# Patient Record
Sex: FEMALE | Race: BLACK OR AFRICAN AMERICAN | NOT HISPANIC OR LATINO | Employment: FULL TIME | URBAN - METROPOLITAN AREA
[De-identification: names, ages, dates, MRNs, and addresses within clinical notes are randomized per-mention and may not be internally consistent; named-entity substitution may affect disease eponyms.]

---

## 2017-10-24 ENCOUNTER — OFFICE VISIT (OUTPATIENT)
Dept: FAMILY MEDICINE CLINIC | Facility: CLINIC | Age: 37
End: 2017-10-24

## 2017-10-24 VITALS
SYSTOLIC BLOOD PRESSURE: 128 MMHG | HEART RATE: 90 BPM | BODY MASS INDEX: 30.16 KG/M2 | OXYGEN SATURATION: 98 % | WEIGHT: 199 LBS | TEMPERATURE: 98.7 F | DIASTOLIC BLOOD PRESSURE: 82 MMHG | HEIGHT: 68 IN

## 2017-10-24 DIAGNOSIS — R94.31 ABNORMAL EKG: ICD-10-CM

## 2017-10-24 DIAGNOSIS — E55.9 VITAMIN D DEFICIENCY: ICD-10-CM

## 2017-10-24 DIAGNOSIS — D50.0 IRON DEFICIENCY ANEMIA DUE TO CHRONIC BLOOD LOSS: ICD-10-CM

## 2017-10-24 DIAGNOSIS — R07.9 CHEST PAIN, UNSPECIFIED TYPE: ICD-10-CM

## 2017-10-24 DIAGNOSIS — R60.0 BILATERAL LEG EDEMA: Primary | ICD-10-CM

## 2017-10-24 DIAGNOSIS — M25.50 ARTHRALGIA OF MULTIPLE JOINTS: ICD-10-CM

## 2017-10-24 DIAGNOSIS — G43.719 INTRACTABLE CHRONIC MIGRAINE WITHOUT AURA AND WITHOUT STATUS MIGRAINOSUS: ICD-10-CM

## 2017-10-24 LAB
25(OH)D3 SERPL-MCNC: 27.4 NG/ML (ref 30–100)
ALBUMIN SERPL-MCNC: 4.1 G/DL (ref 3.5–5.2)
ALBUMIN/GLOB SERPL: 1.2 G/DL
ALP SERPL-CCNC: 85 U/L (ref 39–117)
ALT SERPL W P-5'-P-CCNC: 8 U/L (ref 1–33)
ANION GAP SERPL CALCULATED.3IONS-SCNC: 10.4 MMOL/L
AST SERPL-CCNC: 10 U/L (ref 1–32)
BILIRUB SERPL-MCNC: <0.2 MG/DL (ref 0.1–1.2)
BUN BLD-MCNC: 8 MG/DL (ref 6–20)
BUN/CREAT SERPL: 12.7 (ref 7–25)
CALCIUM SPEC-SCNC: 9.7 MG/DL (ref 8.6–10.5)
CHLORIDE SERPL-SCNC: 103 MMOL/L (ref 98–107)
CHROMATIN AB SERPL-ACNC: <10 IU/ML (ref 0–14)
CO2 SERPL-SCNC: 26.6 MMOL/L (ref 22–29)
CREAT BLD-MCNC: 0.63 MG/DL (ref 0.57–1)
CRP SERPL-MCNC: 0.76 MG/DL (ref 0–0.5)
ERYTHROCYTE [DISTWIDTH] IN BLOOD BY AUTOMATED COUNT: 14.3 % (ref 4.5–15)
ERYTHROCYTE [SEDIMENTATION RATE] IN BLOOD: 20 MM/HR (ref 0–20)
GFR SERPL CREATININE-BSD FRML MDRD: 128 ML/MIN/1.73
GLOBULIN UR ELPH-MCNC: 3.4 GM/DL
GLUCOSE BLD-MCNC: 90 MG/DL (ref 65–99)
HCT VFR BLD AUTO: 36.9 % (ref 31–42)
HGB BLD-MCNC: 11.2 G/DL (ref 12–18)
LYMPHOCYTES # BLD AUTO: 2.3 10*3/MM3 (ref 1.2–3.4)
LYMPHOCYTES NFR BLD AUTO: 27.9 % (ref 21–51)
MCH RBC QN AUTO: 26.7 PG (ref 26.1–33.1)
MCHC RBC AUTO-ENTMCNC: 30.3 G/DL (ref 33–37)
MCV RBC AUTO: 88 FL (ref 80–99)
MONOCYTES # BLD AUTO: 0.6 10*3/MM3 (ref 0.1–0.6)
MONOCYTES NFR BLD AUTO: 7.2 % (ref 2–9)
NEUTROPHILS # BLD AUTO: 5.4 10*3/MM3 (ref 1.4–6.5)
NEUTROPHILS NFR BLD AUTO: 64.9 % (ref 42–75)
NT-PROBNP SERPL-MCNC: 14.6 PG/ML (ref 0–450)
PLATELET # BLD AUTO: 370 10*3/MM3 (ref 150–450)
PMV BLD AUTO: 7 FL (ref 7.1–10.5)
POTASSIUM BLD-SCNC: 4.2 MMOL/L (ref 3.5–5.2)
PROT SERPL-MCNC: 7.5 G/DL (ref 6–8.5)
RBC # BLD AUTO: 4.2 10*6/MM3 (ref 4–6)
SODIUM BLD-SCNC: 140 MMOL/L (ref 136–145)
TSH SERPL DL<=0.05 MIU/L-ACNC: 2.13 MIU/ML (ref 0.27–4.2)
URATE SERPL-MCNC: 3.5 MG/DL (ref 2.4–5.7)
WBC NRBC COR # BLD: 8.3 10*3/MM3 (ref 4.5–10)

## 2017-10-24 PROCEDURE — 83880 ASSAY OF NATRIURETIC PEPTIDE: CPT | Performed by: NURSE PRACTITIONER

## 2017-10-24 PROCEDURE — 82306 VITAMIN D 25 HYDROXY: CPT | Performed by: NURSE PRACTITIONER

## 2017-10-24 PROCEDURE — 80053 COMPREHEN METABOLIC PANEL: CPT | Performed by: NURSE PRACTITIONER

## 2017-10-24 PROCEDURE — 85025 COMPLETE CBC W/AUTO DIFF WBC: CPT | Performed by: NURSE PRACTITIONER

## 2017-10-24 PROCEDURE — 93000 ELECTROCARDIOGRAM COMPLETE: CPT | Performed by: NURSE PRACTITIONER

## 2017-10-24 PROCEDURE — 36415 COLL VENOUS BLD VENIPUNCTURE: CPT | Performed by: NURSE PRACTITIONER

## 2017-10-24 PROCEDURE — 84550 ASSAY OF BLOOD/URIC ACID: CPT | Performed by: NURSE PRACTITIONER

## 2017-10-24 PROCEDURE — 86038 ANTINUCLEAR ANTIBODIES: CPT | Performed by: NURSE PRACTITIONER

## 2017-10-24 PROCEDURE — 84443 ASSAY THYROID STIM HORMONE: CPT | Performed by: NURSE PRACTITIONER

## 2017-10-24 PROCEDURE — 86431 RHEUMATOID FACTOR QUANT: CPT | Performed by: NURSE PRACTITIONER

## 2017-10-24 PROCEDURE — 99214 OFFICE O/P EST MOD 30 MIN: CPT | Performed by: NURSE PRACTITIONER

## 2017-10-24 PROCEDURE — 85652 RBC SED RATE AUTOMATED: CPT | Performed by: NURSE PRACTITIONER

## 2017-10-24 PROCEDURE — 86140 C-REACTIVE PROTEIN: CPT | Performed by: NURSE PRACTITIONER

## 2017-10-24 RX ORDER — NORETHINDRONE ACETATE AND ETHINYL ESTRADIOL, AND FERROUS FUMARATE 1MG-20(24)
KIT ORAL
Refills: 3 | COMMUNITY
Start: 2017-07-19 | End: 2020-04-22

## 2017-10-24 RX ORDER — TRIAMTERENE AND HYDROCHLOROTHIAZIDE 37.5; 25 MG/1; MG/1
CAPSULE ORAL
Qty: 30 CAPSULE | Refills: 11 | Status: SHIPPED | OUTPATIENT
Start: 2017-10-24 | End: 2018-09-21 | Stop reason: SDUPTHER

## 2017-10-24 RX ORDER — DICLOFENAC 35 MG/1
CAPSULE ORAL
Qty: 90 CAPSULE | Refills: 2 | Status: SHIPPED | OUTPATIENT
Start: 2017-10-24 | End: 2018-01-16 | Stop reason: SDUPTHER

## 2017-10-24 NOTE — PATIENT INSTRUCTIONS
restart dyazide 37.5/25 mg daily and monitor BP and swelling, check labs and call with results, abnl EKG refer cardiology consult, consider anxiety or gerd, trial zorvolex 35 mg BID-TID prn

## 2017-10-24 NOTE — PROGRESS NOTES
Subjective   Sonja Sierra is a 37 y.o. female.     History of Present Illness   C/o intermittent chest pain worse with drinking liquids with increasing freq with chest tightness last week, had episode last week in child support thinks r/t stress with elevated HR according to apple watch, With B LE edema on dyazide 37.5/25 mg daily 06/16, then was erx lasix 40 mg 10/16 but had muscle cramps, no prev dx HTN but fam hx of HTN mom, no dizziness, no prev EKG in chart, no prev cardiac consult, some intermittent heartburn, no abd pain   C/o arthritis pain knee and ankle pain request refill arthritis medication, fam hx mom gout  Sees [facility] neuro last saw 10/16 with chronic HA was last tx diclofenac 50 mg but stopped when started other arthritis medication unsure of name, no NV  Sees GYN [address] UTD pap 03/17 with hx of vit d def finished all supplement and with heavy menses and hx of anemia, last hgb 10 07/16    The following portions of the patient's history were reviewed and updated as appropriate: allergies, current medications, past family history, past medical history, past social history, past surgical history and problem list.    Review of Systems   Constitutional: Negative for fever.   HENT: Negative for trouble swallowing and voice change.    Respiratory: Negative for cough, shortness of breath and wheezing.    Cardiovascular: Positive for chest pain and leg swelling. Negative for palpitations.   Gastrointestinal: Negative for abdominal distention, abdominal pain, anal bleeding, blood in stool, constipation, diarrhea, nausea, rectal pain and vomiting.        Heartburn   Endocrine: Negative for cold intolerance, heat intolerance, polydipsia, polyphagia and polyuria.   Musculoskeletal: Positive for arthralgias and joint swelling. Negative for back pain, gait problem, myalgias, neck pain and neck stiffness.   Neurological: Negative for dizziness and headaches.   Psychiatric/Behavioral: Negative for  dysphoric mood, sleep disturbance and suicidal ideas. The patient is nervous/anxious.    All other systems reviewed and are negative.      Objective   Physical Exam   Constitutional: She is oriented to person, place, and time. She appears well-developed and well-nourished.   HENT:   Head: Normocephalic and atraumatic.   Eyes: Conjunctivae and EOM are normal. Pupils are equal, round, and reactive to light.   Neck: Normal range of motion. Neck supple. No thyromegaly present.   Cardiovascular: Normal rate, regular rhythm and normal heart sounds.    Pulmonary/Chest: Effort normal and breath sounds normal.   Musculoskeletal: Normal range of motion. She exhibits edema (B LE pitting).   Lymphadenopathy:     She has no cervical adenopathy.   Neurological: She is alert and oriented to person, place, and time.   Skin: Skin is warm and dry.   Psychiatric: She has a normal mood and affect. Her behavior is normal. Judgment and thought content normal.   Vitals reviewed.    ECG 12 Lead  Date/Time: 10/24/2017 5:51 PM  Performed by: MLAICK NORRIS  Authorized by: MALICK NORRIS   Comparison: not compared with previous ECG   Rhythm: sinus rhythm and A-V block  Rate: normal  Conduction: 1st degree  ST Segments: ST segments normal  T Waves: T waves normal  QRS axis: normal  Other: no other findings  Clinical impression: abnormal ECG          Assessment/Plan   Sonja was seen today for edema, chest pain and med refill.    Diagnoses and all orders for this visit:    Bilateral leg edema  -     TSH  -     Ambulatory Referral to Cardiology  -     proBNP    Intractable chronic migraine without aura and without status migrainosus  -     TSH    Arthralgia of multiple joints  -     Comprehensive Metabolic Panel  -     CRYSTAL  -     C-reactive Protein  -     Rheumatoid Factor, Quant  -     Sedimentation Rate  -     Uric Acid    Iron deficiency anemia due to chronic blood loss  -     CBC & Differential  -     CBC Auto  Differential    Vitamin D deficiency  -     Vitamin D 25 Hydroxy    Chest pain, unspecified type  -     Ambulatory Referral to Cardiology    Abnormal EKG  -     Ambulatory Referral to Cardiology    Other orders  -     triamterene-hydrochlorothiazide (DYAZIDE) 37.5-25 MG per capsule; 1 po qday prn edema  -     Diclofenac (ZORVOLEX) 35 MG capsule; 1 PO TID prn pain  -     ECG 12 Lead    restart dyazide 37.5/25 mg daily and monitor BP and swelling, check labs and call with results, abnl EKG refer cardiology consult, consider anxiety or gerd, trial zorvolex 35 mg BID-TID prn

## 2017-10-25 ENCOUNTER — TELEPHONE (OUTPATIENT)
Dept: FAMILY MEDICINE CLINIC | Facility: CLINIC | Age: 37
End: 2017-10-25

## 2017-10-25 LAB — ANA SER QL: NEGATIVE

## 2017-10-25 RX ORDER — ERGOCALCIFEROL 1.25 MG/1
50000 CAPSULE ORAL
Qty: 12 CAPSULE | Refills: 0 | Status: SHIPPED | OUTPATIENT
Start: 2017-10-25 | End: 2020-04-22

## 2017-10-25 NOTE — TELEPHONE ENCOUNTER
Thyroid, BS, kidney, liver normal. Vit D is low erx vit D 50,000 u once weekly x 12 weeks. Autoimmune panel is negative but shows swelling, think r/t arthritis, please monitor pain on new medication zorvolex 35 mg. Your fluid volume is minimal so not swelling much. Please monitor restarting dyazide daily. Your hemoglobin is sl low, recommend MVI to supplement heavy menses

## 2017-10-26 ENCOUNTER — TELEPHONE (OUTPATIENT)
Dept: FAMILY MEDICINE CLINIC | Facility: CLINIC | Age: 37
End: 2017-10-26

## 2017-11-22 ENCOUNTER — OFFICE VISIT (OUTPATIENT)
Dept: CARDIOLOGY | Facility: CLINIC | Age: 37
End: 2017-11-22

## 2017-11-22 VITALS
WEIGHT: 202 LBS | DIASTOLIC BLOOD PRESSURE: 82 MMHG | SYSTOLIC BLOOD PRESSURE: 138 MMHG | HEIGHT: 68 IN | HEART RATE: 77 BPM | BODY MASS INDEX: 30.62 KG/M2

## 2017-11-22 DIAGNOSIS — R60.0 LOWER EXTREMITY EDEMA: ICD-10-CM

## 2017-11-22 DIAGNOSIS — R06.09 DYSPNEA ON EXERTION: ICD-10-CM

## 2017-11-22 DIAGNOSIS — R07.9 CHEST PAIN, UNSPECIFIED TYPE: Primary | ICD-10-CM

## 2017-11-22 PROCEDURE — 93000 ELECTROCARDIOGRAM COMPLETE: CPT | Performed by: INTERNAL MEDICINE

## 2017-11-22 PROCEDURE — 99204 OFFICE O/P NEW MOD 45 MIN: CPT | Performed by: INTERNAL MEDICINE

## 2017-11-22 RX ORDER — LEVONORGESTREL AND ETHINYL ESTRADIOL 0.1-0.02MG
1 KIT ORAL DAILY
Qty: 28 TABLET
Start: 2017-11-22 | End: 2018-03-07

## 2017-11-22 NOTE — PROGRESS NOTES
Subjective:     Encounter Date:11/22/2017      Patient ID: Sonja Sierra is a 37 y.o. female.    Chief Complaint:  History of Present Illness    This is a 37-year-old female with no significant past medical history who presents for evaluation of lower extremity edema, dyspnea on exertion, and chest pain.    The patient reports that she's been having episodes of chest discomfort for the last few months.  It primarily occurs when she takes a sip of water, swallows pills, or sometimes when she eats solid food.  Symptoms do not occur every time she eats or swallows.  It occurs at least a few times a week however.  She feels like it's increasing in frequency lately.  The discomfort is quite painful and she describes it as a tightness in her chest to the point where she has to clutch her chest.  She is only had one episode of this not related to swallowing it occurred when she was in court for child custody hearing and was feeling stressed and anxious when she developed similar symptoms.  He denies any chest pain with exertion.  She does like she is more short of breath than normal with activity such as climbing stairs.  She has also been having more issues with lower extremity edema primarily in her ankles and feet.  She notices this at the end of the day and it resolves by the following morning.  She was recently started on triamterene/hydrochlorothiazide with some improvement in her lower extremity symptoms.  He denies any family history of heart disease.      Review of Systems   Constitution: Negative for weakness and malaise/fatigue.   HENT: Negative for hearing loss, hoarse voice, nosebleeds and sore throat.    Eyes: Negative for pain.   Cardiovascular: Positive for chest pain, dyspnea on exertion and leg swelling. Negative for claudication, cyanosis, irregular heartbeat, near-syncope, orthopnea, palpitations, paroxysmal nocturnal dyspnea and syncope.   Respiratory: Negative for shortness of breath and snoring.     Endocrine: Negative for cold intolerance, heat intolerance, polydipsia, polyphagia and polyuria.   Skin: Negative for itching and rash.   Musculoskeletal: Negative for arthritis, falls, joint pain, joint swelling, muscle cramps, muscle weakness and myalgias.   Gastrointestinal: Negative for constipation, diarrhea, dysphagia, heartburn, hematemesis, hematochezia, melena, nausea and vomiting.   Genitourinary: Negative for frequency, hematuria and hesitancy.   Neurological: Negative for excessive daytime sleepiness, dizziness, headaches, light-headedness and numbness.   Psychiatric/Behavioral: Negative for depression. The patient is not nervous/anxious.           Current Outpatient Prescriptions:   •  Diclofenac (ZORVOLEX) 35 MG capsule, 1 PO TID prn pain, Disp: 90 capsule, Rfl: 2  •  ORSYTHIA 0.1-20 MG-MCG per tablet, Take 1 tablet by mouth Daily., Disp: 28 tablet, Rfl:   •  TAYTULLA 1-20 MG-MCG(24) capsule, TAKE ONE CAPSULE BY MOUTH EVERY DAY, Disp: , Rfl: 3  •  triamterene-hydrochlorothiazide (DYAZIDE) 37.5-25 MG per capsule, 1 po qday prn edema, Disp: 30 capsule, Rfl: 11  •  vitamin D (ERGOCALCIFEROL) 59660 units capsule capsule, Take 1 capsule by mouth Every 7 (Seven) Days., Disp: 12 capsule, Rfl: 0  •  loratadine (CLARITIN) 10 MG tablet, Take 1 tablet by mouth daily., Disp: 30 tablet, Rfl: 5    Past Medical History:   Diagnosis Date   • Arthralgia of multiple joints    • Bilateral leg edema    • Chest pain    • Chest pain    • Iron deficiency anemia due to chronic blood loss    • Migraine    • Vitamin D deficiency      Past Surgical History:   Procedure Laterality Date   • UTERINE FIBROID SURGERY       Family History   Problem Relation Age of Onset   • Hypertension Mother    • Gout Mother    • Arthritis Mother    • Hypertension Father      Social History   Substance Use Topics   • Smoking status: Never Smoker   • Smokeless tobacco: Never Used   • Alcohol use Yes      Comment: occasionally           ECG 12  "Lead  Date/Time: 11/22/2017 3:20 PM  Performed by: WENDIE EM  Authorized by: WENDIE EM   Comparison: compared with previous ECG   Similar to previous ECG  Rhythm: sinus rhythm  Conduction: 1st degree               Objective:         Visit Vitals   • /82 (BP Location: Right arm, Patient Position: Sitting)  Comment: lft 132 80   • Pulse 77   • Ht 68\" (172.7 cm)   • Wt 202 lb (91.6 kg)   • BMI 30.71 kg/m2          Physical Exam   Constitutional: She is oriented to person, place, and time. She appears well-developed and well-nourished.   HENT:   Head: Normocephalic and atraumatic.   Eyes: Conjunctivae, EOM and lids are normal. Pupils are equal, round, and reactive to light.   Neck: Normal range of motion and full passive range of motion without pain. Neck supple. No JVD present. Carotid bruit is not present.   Cardiovascular: Normal rate, regular rhythm, S1 normal and S2 normal.  Exam reveals no gallop.    No murmur heard.  Pulses:       Radial pulses are 2+ on the right side, and 2+ on the left side.   No bilateral lower extremity edema   Pulmonary/Chest: Effort normal and breath sounds normal.   Abdominal: Soft. Normal appearance.   Lymphadenopathy:     She has no cervical adenopathy.   Neurological: She is alert and oriented to person, place, and time.   Skin: Skin is warm, dry and intact.   Psychiatric: She has a normal mood and affect.       Lab Review:       Assessment:          Diagnosis Plan   1. Chest pain, unspecified type  Treadmill Stress Test    Adult Transthoracic Echo Complete W/ Cont if Necessary Per Protocol   2. Dyspnea on exertion  Treadmill Stress Test    Adult Transthoracic Echo Complete W/ Cont if Necessary Per Protocol   3. Lower extremity edema  Adult Transthoracic Echo Complete W/ Cont if Necessary Per Protocol          Plan:       1.  Chest discomfort.  Based on her history I suspect her pain is more likely due to an esophageal issue possibly esophageal spasm.  I explained " this at length with the patient.  She is still quite concerned about possible cardiac issue so in order to rule this out and get her set up for a plain treadmill stress test.  In the meanwhile I suggested she try taking over-the-counter omeprazole or ranitidine to see if this will help with symptoms.  2.  Dyspnea on exertion.  In addition to the stress test will also get her set up for an echocardiogram.  3.  Lower extremity edema.  As per #2.    Will call and discussed results of the stress test and the echocardiogram.  If her cardiac workup does end up being normal would recommend a GI evaluation.

## 2017-12-12 ENCOUNTER — HOSPITAL ENCOUNTER (OUTPATIENT)
Dept: CARDIOLOGY | Facility: HOSPITAL | Age: 37
Discharge: HOME | End: 2017-12-12
Attending: INTERNAL MEDICINE

## 2017-12-12 ENCOUNTER — HOSPITAL ENCOUNTER (OUTPATIENT)
Dept: CARDIOLOGY | Facility: HOSPITAL | Age: 37
Discharge: HOME | End: 2017-12-12
Attending: INTERNAL MEDICINE | Admitting: INTERNAL MEDICINE

## 2017-12-12 VITALS
SYSTOLIC BLOOD PRESSURE: 140 MMHG | WEIGHT: 202 LBS | DIASTOLIC BLOOD PRESSURE: 80 MMHG | BODY MASS INDEX: 30.62 KG/M2 | HEIGHT: 68 IN | HEART RATE: 55 BPM

## 2017-12-12 LAB
BH CV ECHO MEAS - ACS: 2.1 CM
BH CV ECHO MEAS - AO MAX PG (FULL): 3.7 MMHG
BH CV ECHO MEAS - AO MAX PG: 6.8 MMHG
BH CV ECHO MEAS - AO MEAN PG (FULL): 1.9 MMHG
BH CV ECHO MEAS - AO MEAN PG: 3.8 MMHG
BH CV ECHO MEAS - AO ROOT AREA (BSA CORRECTED): 1.4
BH CV ECHO MEAS - AO ROOT AREA: 6.5 CM^2
BH CV ECHO MEAS - AO ROOT DIAM: 2.9 CM
BH CV ECHO MEAS - AO V2 MAX: 130.3 CM/SEC
BH CV ECHO MEAS - AO V2 MEAN: 90.1 CM/SEC
BH CV ECHO MEAS - AO V2 VTI: 31.7 CM
BH CV ECHO MEAS - AVA(I,A): 1.9 CM^2
BH CV ECHO MEAS - AVA(I,D): 1.9 CM^2
BH CV ECHO MEAS - AVA(V,A): 1.9 CM^2
BH CV ECHO MEAS - AVA(V,D): 1.9 CM^2
BH CV ECHO MEAS - BSA(HAYCOCK): 2.1 M^2
BH CV ECHO MEAS - BSA: 2.1 M^2
BH CV ECHO MEAS - BZI_BMI: 30.7 KILOGRAMS/M^2
BH CV ECHO MEAS - BZI_METRIC_HEIGHT: 172.7 CM
BH CV ECHO MEAS - BZI_METRIC_WEIGHT: 91.6 KG
BH CV ECHO MEAS - CONTRAST EF (2CH): 60.2 ML/M^2
BH CV ECHO MEAS - CONTRAST EF 4CH: 55.2 ML/M^2
BH CV ECHO MEAS - EDV(MOD-SP2): 98 ML
BH CV ECHO MEAS - EDV(MOD-SP4): 87 ML
BH CV ECHO MEAS - EDV(TEICH): 116.9 ML
BH CV ECHO MEAS - EF(CUBED): 73.8 %
BH CV ECHO MEAS - EF(MOD-SP2): 60.2 %
BH CV ECHO MEAS - EF(MOD-SP4): 55.2 %
BH CV ECHO MEAS - EF(TEICH): 65.3 %
BH CV ECHO MEAS - ESV(MOD-SP2): 39 ML
BH CV ECHO MEAS - ESV(MOD-SP4): 39 ML
BH CV ECHO MEAS - ESV(TEICH): 40.5 ML
BH CV ECHO MEAS - FS: 36 %
BH CV ECHO MEAS - IVS/LVPW: 1.2
BH CV ECHO MEAS - IVSD: 0.93 CM
BH CV ECHO MEAS - LAT PEAK E' VEL: 16 CM/SEC
BH CV ECHO MEAS - LV DIASTOLIC VOL/BSA (35-75): 42.4 ML/M^2
BH CV ECHO MEAS - LV MASS(C)D: 143.7 GRAMS
BH CV ECHO MEAS - LV MASS(C)DI: 70 GRAMS/M^2
BH CV ECHO MEAS - LV MAX PG: 3 MMHG
BH CV ECHO MEAS - LV MEAN PG: 1.9 MMHG
BH CV ECHO MEAS - LV SYSTOLIC VOL/BSA (12-30): 19 ML/M^2
BH CV ECHO MEAS - LV V1 MAX: 87.3 CM/SEC
BH CV ECHO MEAS - LV V1 MEAN: 66.2 CM/SEC
BH CV ECHO MEAS - LV V1 VTI: 20.7 CM
BH CV ECHO MEAS - LVIDD: 5 CM
BH CV ECHO MEAS - LVIDS: 3.2 CM
BH CV ECHO MEAS - LVLD AP2: 8.1 CM
BH CV ECHO MEAS - LVLD AP4: 7.9 CM
BH CV ECHO MEAS - LVLS AP2: 7.1 CM
BH CV ECHO MEAS - LVLS AP4: 7 CM
BH CV ECHO MEAS - LVOT AREA (M): 2.8 CM^2
BH CV ECHO MEAS - LVOT AREA: 2.8 CM^2
BH CV ECHO MEAS - LVOT DIAM: 1.9 CM
BH CV ECHO MEAS - LVPWD: 0.75 CM
BH CV ECHO MEAS - MED PEAK E' VEL: 9 CM/SEC
BH CV ECHO MEAS - MV A DUR: 0.1 SEC
BH CV ECHO MEAS - MV A MAX VEL: 73.7 CM/SEC
BH CV ECHO MEAS - MV DEC SLOPE: 624.5 CM/SEC^2
BH CV ECHO MEAS - MV DEC TIME: 0.17 SEC
BH CV ECHO MEAS - MV E MAX VEL: 101.4 CM/SEC
BH CV ECHO MEAS - MV E/A: 1.4
BH CV ECHO MEAS - MV MAX PG: 4.7 MMHG
BH CV ECHO MEAS - MV MEAN PG: 1.6 MMHG
BH CV ECHO MEAS - MV P1/2T MAX VEL: 100.9 CM/SEC
BH CV ECHO MEAS - MV P1/2T: 47.3 MSEC
BH CV ECHO MEAS - MV V2 MAX: 108.6 CM/SEC
BH CV ECHO MEAS - MV V2 MEAN: 56.8 CM/SEC
BH CV ECHO MEAS - MV V2 VTI: 31.4 CM
BH CV ECHO MEAS - MVA P1/2T LCG: 2.2 CM^2
BH CV ECHO MEAS - MVA(P1/2T): 4.6 CM^2
BH CV ECHO MEAS - MVA(VTI): 1.9 CM^2
BH CV ECHO MEAS - PA MAX PG (FULL): 4.5 MMHG
BH CV ECHO MEAS - PA MAX PG: 6.5 MMHG
BH CV ECHO MEAS - PA V2 MAX: 127.6 CM/SEC
BH CV ECHO MEAS - PI END-D VEL: 101.8 CM/SEC
BH CV ECHO MEAS - PULM A REVS DUR: 0.12 SEC
BH CV ECHO MEAS - PULM A REVS VEL: 28.1 CM/SEC
BH CV ECHO MEAS - PULM DIAS VEL: 36.9 CM/SEC
BH CV ECHO MEAS - PULM S/D: 1.9
BH CV ECHO MEAS - PULM SYS VEL: 71.3 CM/SEC
BH CV ECHO MEAS - PVA(V,A): 1.7 CM^2
BH CV ECHO MEAS - PVA(V,D): 1.7 CM^2
BH CV ECHO MEAS - QP/QS: 0.85
BH CV ECHO MEAS - RV MAX PG: 2 MMHG
BH CV ECHO MEAS - RV MEAN PG: 1.2 MMHG
BH CV ECHO MEAS - RV V1 MAX: 70.8 CM/SEC
BH CV ECHO MEAS - RV V1 MEAN: 50.6 CM/SEC
BH CV ECHO MEAS - RV V1 VTI: 16.6 CM
BH CV ECHO MEAS - RVOT AREA: 3 CM^2
BH CV ECHO MEAS - RVOT DIAM: 2 CM
BH CV ECHO MEAS - SI(AO): 100.3 ML/M^2
BH CV ECHO MEAS - SI(CUBED): 44.3 ML/M^2
BH CV ECHO MEAS - SI(LVOT): 28.6 ML/M^2
BH CV ECHO MEAS - SI(MOD-SP2): 28.7 ML/M^2
BH CV ECHO MEAS - SI(MOD-SP4): 23.4 ML/M^2
BH CV ECHO MEAS - SI(TEICH): 37.2 ML/M^2
BH CV ECHO MEAS - SUP REN AO DIAM: 1.2 CM
BH CV ECHO MEAS - SV(AO): 205.8 ML
BH CV ECHO MEAS - SV(CUBED): 90.8 ML
BH CV ECHO MEAS - SV(LVOT): 58.7 ML
BH CV ECHO MEAS - SV(MOD-SP2): 59 ML
BH CV ECHO MEAS - SV(MOD-SP4): 48 ML
BH CV ECHO MEAS - SV(RVOT): 49.9 ML
BH CV ECHO MEAS - SV(TEICH): 76.4 ML
BH CV ECHO MEAS - TAPSE (>1.6): 2.1 CM2
BH CV STRESS BP STAGE 1: NORMAL
BH CV STRESS BP STAGE 2: NORMAL
BH CV STRESS BP STAGE 3: NORMAL
BH CV STRESS DURATION MIN STAGE 1: 3
BH CV STRESS DURATION MIN STAGE 2: 3
BH CV STRESS DURATION MIN STAGE 3: 3
BH CV STRESS DURATION SEC STAGE 1: 0
BH CV STRESS DURATION SEC STAGE 2: 0
BH CV STRESS DURATION SEC STAGE 3: 0
BH CV STRESS GRADE STAGE 1: 10
BH CV STRESS GRADE STAGE 2: 12
BH CV STRESS GRADE STAGE 3: 14
BH CV STRESS HR STAGE 1: 130
BH CV STRESS HR STAGE 2: 153
BH CV STRESS HR STAGE 3: 176
BH CV STRESS METS STAGE 1: 5
BH CV STRESS METS STAGE 2: 7.5
BH CV STRESS METS STAGE 3: 10
BH CV STRESS PROTOCOL 1: NORMAL
BH CV STRESS RECOVERY BP: NORMAL MMHG
BH CV STRESS RECOVERY HR: 98 BPM
BH CV STRESS SPEED STAGE 1: 1.7
BH CV STRESS SPEED STAGE 2: 2.5
BH CV STRESS SPEED STAGE 3: 3.4
BH CV STRESS STAGE 1: 1
BH CV STRESS STAGE 2: 2
BH CV STRESS STAGE 3: 3
BH CV XLRA - RV BASE: 3.3 CM
BH CV XLRA - TDI S': 15 CM/SEC
E/E' RATIO: 8
LEFT ATRIUM VOLUME INDEX: 33 ML/M2
LEFT ATRIUM VOLUME: 69 CM3
MAXIMAL PREDICTED HEART RATE: 181 BPM
MAXIMAL PREDICTED HEART RATE: 181 BPM
PERCENT MAX PREDICTED HR: 97.24 %
STRESS BASELINE BP: NORMAL MMHG
STRESS BASELINE HR: 73 BPM
STRESS PERCENT HR: 114 %
STRESS POST ESTIMATED WORKLOAD: 10 METS
STRESS POST EXERCISE DUR MIN: 9 MIN
STRESS POST EXERCISE DUR SEC: 0 SEC
STRESS POST PEAK BP: NORMAL MMHG
STRESS POST PEAK HR: 176 BPM
STRESS TARGET HR: 154 BPM
STRESS TARGET HR: 154 BPM

## 2017-12-12 PROCEDURE — 93306 TTE W/DOPPLER COMPLETE: CPT | Performed by: INTERNAL MEDICINE

## 2017-12-12 PROCEDURE — 93018 CV STRESS TEST I&R ONLY: CPT | Performed by: INTERNAL MEDICINE

## 2017-12-12 PROCEDURE — 93017 CV STRESS TEST TRACING ONLY: CPT

## 2017-12-12 PROCEDURE — 93306 TTE W/DOPPLER COMPLETE: CPT

## 2017-12-12 PROCEDURE — 93016 CV STRESS TEST SUPVJ ONLY: CPT | Performed by: INTERNAL MEDICINE

## 2017-12-13 ENCOUNTER — TELEPHONE (OUTPATIENT)
Dept: CARDIOLOGY | Facility: CLINIC | Age: 37
End: 2017-12-13

## 2017-12-13 NOTE — TELEPHONE ENCOUNTER
Called pt, no answer, left message with normal results. Informed pt if chest pain persist to call PCP to look into GI cause. Thanks, [place]

## 2018-01-17 RX ORDER — DICLOFENAC 35 MG/1
CAPSULE ORAL
Qty: 90 CAPSULE | Refills: 1 | Status: SHIPPED | OUTPATIENT
Start: 2018-01-17 | End: 2018-07-07 | Stop reason: SDUPTHER

## 2018-02-06 RX ORDER — ERGOCALCIFEROL 1.25 MG/1
CAPSULE ORAL
Qty: 12 CAPSULE | Refills: 0 | OUTPATIENT
Start: 2018-02-06

## 2018-03-07 ENCOUNTER — OFFICE VISIT (OUTPATIENT)
Dept: FAMILY MEDICINE CLINIC | Facility: CLINIC | Age: 38
End: 2018-03-07

## 2018-03-07 VITALS
BODY MASS INDEX: 30.01 KG/M2 | DIASTOLIC BLOOD PRESSURE: 70 MMHG | OXYGEN SATURATION: 98 % | HEART RATE: 81 BPM | TEMPERATURE: 98.5 F | SYSTOLIC BLOOD PRESSURE: 120 MMHG | RESPIRATION RATE: 16 BRPM | HEIGHT: 68 IN | WEIGHT: 198 LBS

## 2018-03-07 DIAGNOSIS — J01.10 ACUTE FRONTAL SINUSITIS, RECURRENCE NOT SPECIFIED: Primary | ICD-10-CM

## 2018-03-07 PROCEDURE — 99213 OFFICE O/P EST LOW 20 MIN: CPT | Performed by: NURSE PRACTITIONER

## 2018-03-07 RX ORDER — IRON PS COMPLEX/B12/FOLIC ACID 150-25-1
CAPSULE ORAL
Refills: 6 | COMMUNITY
Start: 2018-02-27 | End: 2020-04-22

## 2018-03-07 RX ORDER — AMOXICILLIN 875 MG/1
875 TABLET, COATED ORAL 2 TIMES DAILY
Qty: 14 TABLET | Refills: 0 | Status: SHIPPED | OUTPATIENT
Start: 2018-03-07 | End: 2018-03-14

## 2018-03-07 RX ORDER — OSELTAMIVIR PHOSPHATE 75 MG/1
75 CAPSULE ORAL
COMMUNITY
Start: 2018-03-05 | End: 2018-03-07

## 2018-03-07 RX ORDER — FLUTICASONE PROPIONATE 50 MCG
2 SPRAY, SUSPENSION (ML) NASAL
COMMUNITY
Start: 2018-03-05 | End: 2020-11-19

## 2018-03-07 NOTE — PATIENT INSTRUCTIONS
Start amoxicillin 875mg 2x day for 7 days.   Cont claritin and flonase.   May cont tamiflu.  If symptoms persist 5-7 days call office.   Increase fluid intake, get plenty of rest.   Patient agrees with plan of care and understands instructions. Call if worsening symptoms or any problems or concerns.

## 2018-03-07 NOTE — PROGRESS NOTES
Subjective   Sonja Sierra is a 37 y.o. female.     History of Present Illness   C/o sinus pressure, started about 5 days ago, she went to [facility] Friday, she states she had aches, sore throat, HA at that time, she was given cough medicine, she states she did not have flu test. She states she has congestion, sinus pressure, she was given tamiflu and nasal spray on Monday, she is currently taking this. She has hoarseness. She denies fever. She does have sore throat, she tried cough drops,she tried OTC allergy medication. She denies wheezing, she does have a productive cough, green in color.     The following portions of the patient's history were reviewed and updated as appropriate: allergies, current medications, past family history, past medical history, past social history, past surgical history and problem list.    Review of Systems   Constitutional: Negative for chills, diaphoresis and fever.   HENT: Positive for congestion, rhinorrhea and sinus pressure. Negative for sore throat.    Eyes: Negative for pain.   Respiratory: Positive for cough. Negative for shortness of breath and wheezing.    Cardiovascular: Negative for chest pain.   Musculoskeletal: Negative for arthralgias and myalgias.   Neurological: Positive for headaches. Negative for dizziness and light-headedness.   All other systems reviewed and are negative.      Objective   Physical Exam   Constitutional: She is oriented to person, place, and time. She appears well-developed and well-nourished.   HENT:   Head: Normocephalic.   Right Ear: Tympanic membrane and ear canal normal.   Left Ear: Tympanic membrane and ear canal normal.   Nose: Mucosal edema present. Right sinus exhibits frontal sinus tenderness. Left sinus exhibits frontal sinus tenderness.   Mouth/Throat: Uvula is midline and mucous membranes are normal. Posterior oropharyngeal erythema present.   Eyes: Pupils are equal, round, and reactive to light.   Neck: Neck supple.   Cardiovascular:  Normal rate, regular rhythm and normal heart sounds.    Pulmonary/Chest: Effort normal and breath sounds normal. No respiratory distress. She has no decreased breath sounds. She has no wheezes. She has no rhonchi.   Musculoskeletal: Normal range of motion.   Neurological: She is alert and oriented to person, place, and time.   Skin: Skin is warm and dry.   Psychiatric: She has a normal mood and affect. Her behavior is normal. Judgment and thought content normal.   Nursing note and vitals reviewed.      Assessment/Plan   Sonja was seen today for sinusitis.    Diagnoses and all orders for this visit:    Acute frontal sinusitis, recurrence not specified    Other orders  -     amoxicillin (AMOXIL) 875 MG tablet; Take 1 tablet by mouth 2 (Two) Times a Day for 7 days.    Start amoxicillin 875mg 2x day for 7 days.   Cont claritin and flonase.   May cont tamiflu.  If symptoms persist 5-7 days call office.   Increase fluid intake, get plenty of rest.   Patient agrees with plan of care and understands instructions. Call if worsening symptoms or any problems or concerns.

## 2018-03-12 ENCOUNTER — TELEPHONE (OUTPATIENT)
Dept: FAMILY MEDICINE CLINIC | Facility: CLINIC | Age: 38
End: 2018-03-12

## 2018-03-12 RX ORDER — FLUCONAZOLE 150 MG/1
150 TABLET ORAL ONCE
Qty: 1 TABLET | Refills: 0 | Status: SHIPPED | OUTPATIENT
Start: 2018-03-12 | End: 2018-03-12

## 2018-03-12 NOTE — TELEPHONE ENCOUNTER
WAS PRESCRIBED AN ANTIBIOTIC AND NOW NEEDS DIFLUCAN CALLED IN.  PLEASE USE [facility] ON [address].

## 2018-07-09 RX ORDER — DICLOFENAC 35 MG/1
CAPSULE ORAL
Qty: 90 CAPSULE | Refills: 2 | Status: SHIPPED | OUTPATIENT
Start: 2018-07-09 | End: 2018-09-29 | Stop reason: SDUPTHER

## 2018-09-18 ENCOUNTER — APPOINTMENT (OUTPATIENT)
Dept: WOMENS IMAGING | Facility: HOSPITAL | Age: 38
End: 2018-09-18

## 2018-09-18 PROCEDURE — 77063 BREAST TOMOSYNTHESIS BI: CPT | Performed by: RADIOLOGY

## 2018-09-18 PROCEDURE — 77067 SCR MAMMO BI INCL CAD: CPT | Performed by: RADIOLOGY

## 2018-09-21 ENCOUNTER — OFFICE VISIT (OUTPATIENT)
Dept: FAMILY MEDICINE CLINIC | Facility: CLINIC | Age: 38
End: 2018-09-21

## 2018-09-21 VITALS
SYSTOLIC BLOOD PRESSURE: 112 MMHG | HEIGHT: 68 IN | RESPIRATION RATE: 16 BRPM | OXYGEN SATURATION: 100 % | HEART RATE: 87 BPM | DIASTOLIC BLOOD PRESSURE: 82 MMHG | WEIGHT: 199.6 LBS | TEMPERATURE: 97.9 F | BODY MASS INDEX: 30.25 KG/M2

## 2018-09-21 DIAGNOSIS — M25.562 PAIN IN BOTH KNEES, UNSPECIFIED CHRONICITY: Primary | ICD-10-CM

## 2018-09-21 DIAGNOSIS — D50.9 IRON DEFICIENCY ANEMIA, UNSPECIFIED IRON DEFICIENCY ANEMIA TYPE: ICD-10-CM

## 2018-09-21 DIAGNOSIS — R60.0 LOWER EXTREMITY EDEMA: ICD-10-CM

## 2018-09-21 DIAGNOSIS — J02.9 SORE THROAT: ICD-10-CM

## 2018-09-21 DIAGNOSIS — T78.40XD ALLERGIC REACTION, SUBSEQUENT ENCOUNTER: ICD-10-CM

## 2018-09-21 DIAGNOSIS — M25.561 PAIN IN BOTH KNEES, UNSPECIFIED CHRONICITY: Primary | ICD-10-CM

## 2018-09-21 LAB
ALBUMIN SERPL-MCNC: 4.4 G/DL (ref 3.5–5.2)
ALBUMIN/GLOB SERPL: 1.3 G/DL
ALP SERPL-CCNC: 97 U/L (ref 39–117)
ALT SERPL W P-5'-P-CCNC: 14 U/L (ref 1–33)
ANION GAP SERPL CALCULATED.3IONS-SCNC: 10.1 MMOL/L
AST SERPL-CCNC: 13 U/L (ref 1–32)
BILIRUB SERPL-MCNC: <0.2 MG/DL (ref 0.1–1.2)
BUN BLD-MCNC: 13 MG/DL (ref 6–20)
BUN/CREAT SERPL: 17.6 (ref 7–25)
CALCIUM SPEC-SCNC: 9.2 MG/DL (ref 8.6–10.5)
CHLORIDE SERPL-SCNC: 103 MMOL/L (ref 98–107)
CHROMATIN AB SERPL-ACNC: <10 IU/ML (ref 0–14)
CO2 SERPL-SCNC: 27.9 MMOL/L (ref 22–29)
CREAT BLD-MCNC: 0.74 MG/DL (ref 0.57–1)
CRP SERPL-MCNC: 0.53 MG/DL (ref 0–0.5)
ERYTHROCYTE [DISTWIDTH] IN BLOOD BY AUTOMATED COUNT: 20.5 % (ref 4.5–15)
ERYTHROCYTE [SEDIMENTATION RATE] IN BLOOD: 10 MM/HR (ref 0–20)
GFR SERPL CREATININE-BSD FRML MDRD: 105 ML/MIN/1.73
GLOBULIN UR ELPH-MCNC: 3.5 GM/DL
GLUCOSE BLD-MCNC: 88 MG/DL (ref 65–99)
HCT VFR BLD AUTO: 38.1 % (ref 31–42)
HGB BLD-MCNC: 11.9 G/DL (ref 12–18)
LYMPHOCYTES # BLD AUTO: 1.8 10*3/MM3 (ref 1.2–3.4)
LYMPHOCYTES NFR BLD AUTO: 17 % (ref 21–51)
MCH RBC QN AUTO: 25.6 PG (ref 26.1–33.1)
MCHC RBC AUTO-ENTMCNC: 31.2 G/DL (ref 33–37)
MCV RBC AUTO: 81.9 FL (ref 80–99)
MONOCYTES # BLD AUTO: 0.7 10*3/MM3 (ref 0.1–0.6)
MONOCYTES NFR BLD AUTO: 6.7 % (ref 2–9)
NEUTROPHILS # BLD AUTO: 7.9 10*3/MM3 (ref 1.4–6.5)
NEUTROPHILS NFR BLD AUTO: 76.3 % (ref 42–75)
PLATELET # BLD AUTO: 347 10*3/MM3 (ref 150–450)
PMV BLD AUTO: 7.5 FL (ref 7.1–10.5)
POTASSIUM BLD-SCNC: 4.5 MMOL/L (ref 3.5–5.2)
PROT SERPL-MCNC: 7.9 G/DL (ref 6–8.5)
RBC # BLD AUTO: 4.65 10*6/MM3 (ref 4–6)
SODIUM BLD-SCNC: 141 MMOL/L (ref 136–145)
URATE SERPL-MCNC: 5.2 MG/DL (ref 2.4–5.7)
WBC NRBC COR # BLD: 10.3 10*3/MM3 (ref 4.5–10)

## 2018-09-21 PROCEDURE — 85025 COMPLETE CBC W/AUTO DIFF WBC: CPT | Performed by: NURSE PRACTITIONER

## 2018-09-21 PROCEDURE — 80053 COMPREHEN METABOLIC PANEL: CPT | Performed by: NURSE PRACTITIONER

## 2018-09-21 PROCEDURE — 86038 ANTINUCLEAR ANTIBODIES: CPT | Performed by: NURSE PRACTITIONER

## 2018-09-21 PROCEDURE — 86431 RHEUMATOID FACTOR QUANT: CPT | Performed by: NURSE PRACTITIONER

## 2018-09-21 PROCEDURE — 84550 ASSAY OF BLOOD/URIC ACID: CPT | Performed by: NURSE PRACTITIONER

## 2018-09-21 PROCEDURE — 86140 C-REACTIVE PROTEIN: CPT | Performed by: NURSE PRACTITIONER

## 2018-09-21 PROCEDURE — 99214 OFFICE O/P EST MOD 30 MIN: CPT | Performed by: NURSE PRACTITIONER

## 2018-09-21 PROCEDURE — 36415 COLL VENOUS BLD VENIPUNCTURE: CPT | Performed by: NURSE PRACTITIONER

## 2018-09-21 PROCEDURE — 85652 RBC SED RATE AUTOMATED: CPT | Performed by: NURSE PRACTITIONER

## 2018-09-21 RX ORDER — METHYLPREDNISOLONE 4 MG/1
TABLET ORAL
COMMUNITY
Start: 2018-09-19 | End: 2019-10-28

## 2018-09-21 RX ORDER — TRIAMTERENE AND HYDROCHLOROTHIAZIDE 37.5; 25 MG/1; MG/1
CAPSULE ORAL
Qty: 30 CAPSULE | Refills: 11 | Status: SHIPPED | OUTPATIENT
Start: 2018-09-21 | End: 2019-02-05 | Stop reason: SDUPTHER

## 2018-09-21 RX ORDER — FAMOTIDINE 20 MG/1
20 TABLET, FILM COATED ORAL
COMMUNITY
Start: 2018-09-19 | End: 2018-10-04

## 2018-09-21 RX ORDER — DIPHENHYDRAMINE HCL 25 MG
25 CAPSULE ORAL
COMMUNITY
Start: 2018-09-19 | End: 2018-09-23

## 2018-09-21 NOTE — PATIENT INSTRUCTIONS
"DASH Eating Plan  DASH stands for \"Dietary Approaches to Stop Hypertension.\" The DASH eating plan is a healthy eating plan that has been shown to reduce high blood pressure (hypertension). It may also reduce your risk for type 2 diabetes, heart disease, and stroke. The DASH eating plan may also help with weight loss.  What are tips for following this plan?  General guidelines  · Avoid eating more than 2,300 mg (milligrams) of salt (sodium) a day. If you have hypertension, you may need to reduce your sodium intake to 1,500 mg a day.  · Limit alcohol intake to no more than 1 drink a day for nonpregnant women and 2 drinks a day for men. One drink equals 12 oz of beer, 5 oz of wine, or 1½ oz of hard liquor.  · Work with your health care provider to maintain a healthy body weight or to lose weight. Ask what an ideal weight is for you.  · Get at least 30 minutes of exercise that causes your heart to beat faster (aerobic exercise) most days of the week. Activities may include walking, swimming, or biking.  · Work with your health care provider or diet and nutrition specialist (dietitian) to adjust your eating plan to your individual calorie needs.  Reading food labels  · Check food labels for the amount of sodium per serving. Choose foods with less than 5 percent of the Daily Value of sodium. Generally, foods with less than 300 mg of sodium per serving fit into this eating plan.  · To find whole grains, look for the word \"whole\" as the first word in the ingredient list.  Shopping  · Buy products labeled as \"low-sodium\" or \"no salt added.\"  · Buy fresh foods. Avoid canned foods and premade or frozen meals.  Cooking  · Avoid adding salt when cooking. Use salt-free seasonings or herbs instead of table salt or sea salt. Check with your health care provider or pharmacist before using salt substitutes.  · Do not pepe foods. Cook foods using healthy methods such as baking, boiling, grilling, and broiling instead.  · Cook with " heart-healthy oils, such as olive, canola, soybean, or sunflower oil.  Meal planning    · Eat a balanced diet that includes:  ? 5 or more servings of fruits and vegetables each day. At each meal, try to fill half of your plate with fruits and vegetables.  ? Up to 6-8 servings of whole grains each day.  ? Less than 6 oz of lean meat, poultry, or fish each day. A 3-oz serving of meat is about the same size as a deck of cards. One egg equals 1 oz.  ? 2 servings of low-fat dairy each day.  ? A serving of nuts, seeds, or beans 5 times each week.  ? Heart-healthy fats. Healthy fats called Omega-3 fatty acids are found in foods such as flaxseeds and coldwater fish, like sardines, salmon, and mackerel.  · Limit how much you eat of the following:  ? Canned or prepackaged foods.  ? Food that is high in trans fat, such as fried foods.  ? Food that is high in saturated fat, such as fatty meat.  ? Sweets, desserts, sugary drinks, and other foods with added sugar.  ? Full-fat dairy products.  · Do not salt foods before eating.  · Try to eat at least 2 vegetarian meals each week.  · Eat more home-cooked food and less restaurant, buffet, and fast food.  · When eating at a restaurant, ask that your food be prepared with less salt or no salt, if possible.  What foods are recommended?  The items listed may not be a complete list. Talk with your dietitian about what dietary choices are best for you.  Grains  Whole-grain or whole-wheat bread. Whole-grain or whole-wheat pasta. Brown rice. Oatmeal. Quinoa. Bulgur. Whole-grain and low-sodium cereals. Kerline bread. Low-fat, low-sodium crackers. Whole-wheat flour tortillas.  Vegetables  Fresh or frozen vegetables (raw, steamed, roasted, or grilled). Low-sodium or reduced-sodium tomato and vegetable juice. Low-sodium or reduced-sodium tomato sauce and tomato paste. Low-sodium or reduced-sodium canned vegetables.  Fruits  All fresh, dried, or frozen fruit. Canned fruit in natural juice (without  added sugar).  Meat and other protein foods  Skinless chicken or turkey. Ground chicken or turkey. Pork with fat trimmed off. Fish and seafood. Egg whites. Dried beans, peas, or lentils. Unsalted nuts, nut butters, and seeds. Unsalted canned beans. Lean cuts of beef with fat trimmed off. Low-sodium, lean deli meat.  Dairy  Low-fat (1%) or fat-free (skim) milk. Fat-free, low-fat, or reduced-fat cheeses. Nonfat, low-sodium ricotta or cottage cheese. Low-fat or nonfat yogurt. Low-fat, low-sodium cheese.  Fats and oils  Soft margarine without trans fats. Vegetable oil. Low-fat, reduced-fat, or light mayonnaise and salad dressings (reduced-sodium). Canola, safflower, olive, soybean, and sunflower oils. Avocado.  Seasoning and other foods  Herbs. Spices. Seasoning mixes without salt. Unsalted popcorn and pretzels. Fat-free sweets.  What foods are not recommended?  The items listed may not be a complete list. Talk with your dietitian about what dietary choices are best for you.  Grains  Baked goods made with fat, such as croissants, muffins, or some breads. Dry pasta or rice meal packs.  Vegetables  Creamed or fried vegetables. Vegetables in a cheese sauce. Regular canned vegetables (not low-sodium or reduced-sodium). Regular canned tomato sauce and paste (not low-sodium or reduced-sodium). Regular tomato and vegetable juice (not low-sodium or reduced-sodium). Pickles. Olives.  Fruits  Canned fruit in a light or heavy syrup. Fried fruit. Fruit in cream or butter sauce.  Meat and other protein foods  Fatty cuts of meat. Ribs. Fried meat. Mckay. Sausage. Bologna and other processed lunch meats. Salami. Fatback. Hotdogs. Bratwurst. Salted nuts and seeds. Canned beans with added salt. Canned or smoked fish. Whole eggs or egg yolks. Chicken or turkey with skin.  Dairy  Whole or 2% milk, cream, and half-and-half. Whole or full-fat cream cheese. Whole-fat or sweetened yogurt. Full-fat cheese. Nondairy creamers. Whipped toppings.  Processed cheese and cheese spreads.  Fats and oils  Butter. Stick margarine. Lard. Shortening. Ghee. Mckay fat. Tropical oils, such as coconut, palm kernel, or palm oil.  Seasoning and other foods  Salted popcorn and pretzels. Onion salt, garlic salt, seasoned salt, table salt, and sea salt. Worcestershire sauce. Tartar sauce. Barbecue sauce. Teriyaki sauce. Soy sauce, including reduced-sodium. Steak sauce. Canned and packaged gravies. Fish sauce. Oyster sauce. Cocktail sauce. Horseradish that you find on the shelf. Ketchup. Mustard. Meat flavorings and tenderizers. Bouillon cubes. Hot sauce and Tabasco sauce. Premade or packaged marinades. Premade or packaged taco seasonings. Relishes. Regular salad dressings.  Where to find more information:  · National Heart, Lung, and Blood [place]: www.nhlbi.nih.gov  · American Heart Association: www.heart.org  Summary  · The DASH eating plan is a healthy eating plan that has been shown to reduce high blood pressure (hypertension). It may also reduce your risk for type 2 diabetes, heart disease, and stroke.  · With the DASH eating plan, you should limit salt (sodium) intake to 2,300 mg a day. If you have hypertension, you may need to reduce your sodium intake to 1,500 mg a day.  · When on the DASH eating plan, aim to eat more fresh fruits and vegetables, whole grains, lean proteins, low-fat dairy, and heart-healthy fats.  · Work with your health care provider or diet and nutrition specialist (dietitian) to adjust your eating plan to your individual calorie needs.  This information is not intended to replace advice given to you by your health care provider. Make sure you discuss any questions you have with your health care provider.  Document Released: 12/06/2012 Document Revised: 12/11/2017 Document Reviewed: 12/11/2017  [employer] Patient Education © 2018 [employer]        Cont zorvolex 2x day as needed.   Labs today will call with results.   Reviewed gyn labs  today.   Refer to ortho, will call with appt.   Refer to allergy, educated about epipen, cont medrol pack   Elevate and apply ice to knees as needed.   Increase fluid intake, get plenty of rest.   .Patient agrees with plan of care and understands instructions. Call if worsening symptoms or any problems or concerns.

## 2018-09-21 NOTE — PROGRESS NOTES
Subjective   Sonja Sierra is a 38 y.o. female.     History of Present Illness   Here today to f/u from ER visit, she went to [facility] on 9/19/18, had throat tightness, bloating, started 2 days ago, she was given medrol pack, pepcid, benadryl, epi pen, she had also been seen for knee pain, hx of OA, taking diclofenac as needed, please refer to H&P and d/c shauna for details. She is unsure what allergy was, she states she took zorvolex and dyazide last week that she normally takes, unsure of any food allergies. She does have seasonal allergies, denies being outside much that day. She states she felt throat tightening up,   She has seen ortho for knee pain, a couple of years ago, she denies xrays recently, states she takes zorvolex as needed. Denies playing sports, she states pain in both knees and has swelling.   She has hx of MEÑO, had hysterectomy, had labs at GYN this week, had mammo this week, she is taking iron.   The following portions of the patient's history were reviewed and updated as appropriate: allergies, current medications, past family history, past medical history, past social history, past surgical history and problem list.    Review of Systems   Constitutional: Negative for chills, diaphoresis and fever.   HENT: Negative for sore throat.    Respiratory: Negative for cough, shortness of breath, wheezing and stridor.    Cardiovascular: Negative for chest pain.   Musculoskeletal: Positive for arthralgias. Negative for gait problem, joint swelling and myalgias.   Skin: Negative for pallor.   Allergic/Immunologic: Positive for environmental allergies. Negative for food allergies.   Neurological: Negative for dizziness, light-headedness and headache.   All other systems reviewed and are negative.      Objective   Physical Exam   Constitutional: She is oriented to person, place, and time. She appears well-developed and well-nourished.   HENT:   Head: Normocephalic.   Mouth/Throat: Uvula is midline,  oropharynx is clear and moist and mucous membranes are normal.   Eyes: Pupils are equal, round, and reactive to light.   Neck: Normal range of motion and full passive range of motion without pain. Neck supple. No edema present. No thyromegaly present.   Cardiovascular: Normal rate, regular rhythm, normal heart sounds and intact distal pulses.    Pulmonary/Chest: Effort normal and breath sounds normal. No respiratory distress. She has no decreased breath sounds. She has no wheezes. She has no rhonchi.   Musculoskeletal:        Right knee: She exhibits decreased range of motion and swelling. No tenderness found.        Left knee: She exhibits decreased range of motion and swelling. No tenderness found.   Lymphadenopathy:     She has no cervical adenopathy.   Neurological: She is alert and oriented to person, place, and time.   Skin: Skin is warm and dry.   Psychiatric: She has a normal mood and affect. Her behavior is normal.   Nursing note and vitals reviewed.        Assessment/Plan   Sonja was seen today for allergic reaction.    Diagnoses and all orders for this visit:    Pain in both knees, unspecified chronicity  -     CBC & Differential  -     CRYSTAL  -     C-reactive Protein  -     Rheumatoid Factor  -     Sedimentation Rate  -     Uric Acid  -     Ambulatory Referral to Orthopedic Surgery  -     Comprehensive metabolic panel  -     CBC Auto Differential    Sore throat  -     Ambulatory Referral to Allergy    Allergic reaction, subsequent encounter  -     Ambulatory Referral to Allergy    Lower extremity edema    Iron deficiency anemia, unspecified iron deficiency anemia type    Other orders  -     triamterene-hydrochlorothiazide (DYAZIDE) 37.5-25 MG per capsule; 1 po qday prn edema          Cont zorvolex 2x day as needed.   Labs today will call with results.   Reviewed gyn labs today.   Refer to ortho, will call with appt.   Refer to allergy, educated about epipen, cont medrol pack   Elevate and apply ice to  knees as needed.   Refilled dyazide  Increase fluid intake, get plenty of rest.   .Patient agrees with plan of care and understands instructions. Call if worsening symptoms or any problems or concerns.

## 2018-09-23 LAB — ANA SER QL: NEGATIVE

## 2018-09-24 ENCOUNTER — TELEPHONE (OUTPATIENT)
Dept: FAMILY MEDICINE CLINIC | Facility: CLINIC | Age: 38
End: 2018-09-24

## 2018-09-24 NOTE — TELEPHONE ENCOUNTER
----- Message from STEPHANIE Berry sent at 9/24/2018  8:10 AM EDT -----  Please call patient with results.CRYSTAL, RF normal, BS kidney func liver func normal, inflammatory labs normal, uric normal. Hemoglobin has improved, cont iron, cont f/u with ortho as scheduled, she will be called with appt.    LMOM informed of lab results

## 2018-10-01 RX ORDER — DICLOFENAC 35 MG/1
CAPSULE ORAL
Qty: 90 CAPSULE | Refills: 2 | Status: SHIPPED | OUTPATIENT
Start: 2018-10-01 | End: 2020-04-22

## 2018-10-22 ENCOUNTER — CLINICAL SUPPORT (OUTPATIENT)
Dept: FAMILY MEDICINE CLINIC | Facility: CLINIC | Age: 38
End: 2018-10-22

## 2018-10-22 PROCEDURE — 90471 IMMUNIZATION ADMIN: CPT | Performed by: NURSE PRACTITIONER

## 2018-10-22 PROCEDURE — 90674 CCIIV4 VAC NO PRSV 0.5 ML IM: CPT | Performed by: NURSE PRACTITIONER

## 2019-01-16 ENCOUNTER — OFFICE VISIT (OUTPATIENT)
Dept: FAMILY MEDICINE CLINIC | Facility: CLINIC | Age: 39
End: 2019-01-16

## 2019-01-16 VITALS
SYSTOLIC BLOOD PRESSURE: 138 MMHG | TEMPERATURE: 98.1 F | OXYGEN SATURATION: 97 % | BODY MASS INDEX: 31.13 KG/M2 | WEIGHT: 205.4 LBS | HEART RATE: 80 BPM | DIASTOLIC BLOOD PRESSURE: 86 MMHG | HEIGHT: 68 IN

## 2019-01-16 DIAGNOSIS — F41.9 ANXIETY: Primary | ICD-10-CM

## 2019-01-16 DIAGNOSIS — Z56.6 STRESSFUL JOB: ICD-10-CM

## 2019-01-16 PROCEDURE — 99213 OFFICE O/P EST LOW 20 MIN: CPT | Performed by: NURSE PRACTITIONER

## 2019-01-16 RX ORDER — BUSPIRONE HYDROCHLORIDE 5 MG/1
5 TABLET ORAL 3 TIMES DAILY
Qty: 90 TABLET | Refills: 1 | Status: SHIPPED | OUTPATIENT
Start: 2019-01-16 | End: 2020-06-03

## 2019-01-16 SDOH — HEALTH STABILITY - MENTAL HEALTH: OTHER PHYSICAL AND MENTAL STRAIN RELATED TO WORK: Z56.6

## 2019-01-16 NOTE — PROGRESS NOTES
Subjective   Sonja Sierra is a 38 y.o. female.     History of Present Illness   C/o anxiety, stress at work, crying at work for no particular reason, states she was was hired for new position, then taken from her at work, still working job duties not being compensated, does have some depression, denies hx of anxiety and depression, she denies suicidal ideations. She is working with HR to get issue resolved. She has been crying a lot. PHQ-9 score 6     The following portions of the patient's history were reviewed and updated as appropriate: allergies, current medications, past family history, past medical history, past social history, past surgical history and problem list.    Review of Systems   Constitutional: Negative for chills, diaphoresis and fever.   Respiratory: Negative for cough and shortness of breath.    Cardiovascular: Negative for chest pain.   Musculoskeletal: Negative for arthralgias and myalgias.   Neurological: Negative for dizziness, light-headedness and headache.   Psychiatric/Behavioral: Positive for stress. Negative for sleep disturbance, suicidal ideas and depressed mood. The patient is nervous/anxious.    All other systems reviewed and are negative.      Objective   Physical Exam   Constitutional: She is oriented to person, place, and time. She appears well-developed and well-nourished.   HENT:   Head: Normocephalic.   Eyes: Pupils are equal, round, and reactive to light.   Cardiovascular: Normal rate, regular rhythm and normal heart sounds.   Pulmonary/Chest: Effort normal and breath sounds normal.   Musculoskeletal: Normal range of motion.   Neurological: She is alert and oriented to person, place, and time.   Skin: Skin is warm and dry.   Psychiatric: She has a normal mood and affect. Her speech is normal and behavior is normal. Judgment and thought content normal. Cognition and memory are normal. She expresses no homicidal and no suicidal ideation. She expresses no suicidal plans and no  homicidal plans.   Nursing note and vitals reviewed.        Assessment/Plan   Sonja was seen today for agitation, anxiety and suicidal.    Diagnoses and all orders for this visit:    Anxiety    Stressful job    Other orders  -     busPIRone (BUSPAR) 5 MG tablet; Take 1 tablet by mouth 3 (Three) Times a Day.        List of counselors given to patient, she will call for appt,   Encouraged deep breathing, sleep hygiene, regular exercise,   Trial buspar 5mg 2x day for 1 week then increase to 3x day. SE explained,   If symptoms persist call office,   Increase fluid intake, get plenty of rest.   Patient agrees with plan of care and understands instructions. Call if worsening symptoms or any problems or concerns.

## 2019-01-16 NOTE — PATIENT INSTRUCTIONS
List of counselors given to patient, she will call for appt,   Encouraged deep breathing, sleep hygiene, regular exercise,   Trial buspar 5mg 2x day for 1 week then increase to 3x day. SE explained,   If symptoms persist call office,   Increase fluid intake, get plenty of rest.   Patient agrees with plan of care and understands instructions. Call if worsening symptoms or any problems or concerns.

## 2019-01-28 ENCOUNTER — OFFICE VISIT (OUTPATIENT)
Dept: FAMILY MEDICINE CLINIC | Facility: CLINIC | Age: 39
End: 2019-01-28

## 2019-01-28 VITALS
BODY MASS INDEX: 31.83 KG/M2 | SYSTOLIC BLOOD PRESSURE: 102 MMHG | DIASTOLIC BLOOD PRESSURE: 70 MMHG | TEMPERATURE: 97.8 F | HEART RATE: 71 BPM | OXYGEN SATURATION: 96 % | HEIGHT: 68 IN | WEIGHT: 210 LBS

## 2019-01-28 DIAGNOSIS — H66.012 ACUTE SUPPURATIVE OTITIS MEDIA OF LEFT EAR WITH SPONTANEOUS RUPTURE OF TYMPANIC MEMBRANE, RECURRENCE NOT SPECIFIED: ICD-10-CM

## 2019-01-28 DIAGNOSIS — J01.10 ACUTE FRONTAL SINUSITIS, RECURRENCE NOT SPECIFIED: Primary | ICD-10-CM

## 2019-01-28 PROCEDURE — 99213 OFFICE O/P EST LOW 20 MIN: CPT | Performed by: INTERNAL MEDICINE

## 2019-01-28 RX ORDER — CEFUROXIME AXETIL 250 MG/1
250 TABLET ORAL 2 TIMES DAILY
Qty: 20 TABLET | Refills: 0 | Status: SHIPPED | OUTPATIENT
Start: 2019-01-28 | End: 2019-05-07

## 2019-01-28 NOTE — PROGRESS NOTES
Subjective   Sonja Sierra is a 38 y.o. female.   Ill for approximately 1 week's time both pressure and frontal sinus as well as maxillary sinuses no increased temperature is having ear fullness bilaterally  History of Present Illness   Last week Pressure over teeth with sinus drainage indicating maxillary sinus problems this has improved.  Having headaches as well as frontal sinus pressure and drainage as well.  Ears are bothering patient without drainage.  History of ear infection past.  No reported temperature lungs fairly unremarkable minimal cough but not bad no increased temperature    Review of Systems   HENT: Positive for congestion and ear pain.        Objective   Vitals:    01/28/19 1618   BP: 102/70   Pulse: 71   Temp: 97.8 °F (36.6 °C)   SpO2: 96%   Weight: 95.3 kg (210 lb)     Physical Exam   Constitutional: She appears well-developed and well-nourished.   HENT:   Head: Normocephalic and atraumatic.   Right TM is retracted but no fluid seen behind TM left TM is a need tell as well as retracted.  Throat without significant inflammation.   Eyes: Conjunctivae are normal. Pupils are equal, round, and reactive to light.   Cardiovascular: Normal rate and regular rhythm.   Pulmonary/Chest: Effort normal and breath sounds normal.   Skin: Skin is warm and dry.   Nursing note and vitals reviewed.      No results found for: INR    Procedures    Assessment/Plan     1.  Frontal sinusitis    2.  Left otitis media plan for both please give Ceftin 250 twice a day for 10 days' time follow-up if not well in 10 days' time and as needed.  Patient was advised hearing should improve in about 7 day lauren but not sooner.  Do hope ear pressure being about 3 days    3.  Otalgia right ear.  Plan conservative treatment hopefully resolve with left ear discomfort.    Much of this encounter note is an electronic transcription/translation of spoken language to printed text.  The electronic translation of spoken language may permit  erroneous, or at times, nonsensical words or phrases to be inadvertently transcribed.  Although I have reviewed the note for such errors, some may still exist. If there are questions or for further clarification, please contact me.

## 2019-02-01 ENCOUNTER — TELEPHONE (OUTPATIENT)
Dept: FAMILY MEDICINE CLINIC | Facility: CLINIC | Age: 39
End: 2019-02-01

## 2019-02-01 RX ORDER — FLUCONAZOLE 150 MG/1
TABLET ORAL
Qty: 2 TABLET | Refills: 0 | Status: SHIPPED | OUTPATIENT
Start: 2019-02-01 | End: 2019-05-07 | Stop reason: SDUPTHER

## 2019-02-05 RX ORDER — TRIAMTERENE AND HYDROCHLOROTHIAZIDE 37.5; 25 MG/1; MG/1
CAPSULE ORAL
Qty: 90 CAPSULE | Refills: 1 | Status: SHIPPED | OUTPATIENT
Start: 2019-02-05 | End: 2019-05-09 | Stop reason: SDUPTHER

## 2019-02-06 ENCOUNTER — TELEPHONE (OUTPATIENT)
Dept: FAMILY MEDICINE CLINIC | Facility: CLINIC | Age: 39
End: 2019-02-06

## 2019-02-06 NOTE — TELEPHONE ENCOUNTER
Was seen for sinus infection last week, got better but not all the way, ears still bothering her. Can you give her something else?

## 2019-02-07 RX ORDER — LEVOFLOXACIN 500 MG/1
500 TABLET, FILM COATED ORAL DAILY
Qty: 10 TABLET | Refills: 0 | Status: SHIPPED | OUTPATIENT
Start: 2019-02-07 | End: 2019-05-07

## 2019-02-19 ENCOUNTER — OFFICE VISIT (OUTPATIENT)
Dept: FAMILY MEDICINE CLINIC | Facility: CLINIC | Age: 39
End: 2019-02-19

## 2019-02-19 VITALS
HEART RATE: 71 BPM | HEIGHT: 68 IN | OXYGEN SATURATION: 93 % | BODY MASS INDEX: 31.86 KG/M2 | DIASTOLIC BLOOD PRESSURE: 88 MMHG | SYSTOLIC BLOOD PRESSURE: 124 MMHG | TEMPERATURE: 97.7 F | WEIGHT: 210.2 LBS

## 2019-02-19 DIAGNOSIS — J34.89 SINUS PRESSURE: ICD-10-CM

## 2019-02-19 DIAGNOSIS — N76.0 ACUTE VAGINITIS: ICD-10-CM

## 2019-02-19 DIAGNOSIS — H66.002 ACUTE SUPPURATIVE OTITIS MEDIA OF LEFT EAR WITHOUT SPONTANEOUS RUPTURE OF TYMPANIC MEMBRANE, RECURRENCE NOT SPECIFIED: Primary | ICD-10-CM

## 2019-02-19 PROCEDURE — 99213 OFFICE O/P EST LOW 20 MIN: CPT | Performed by: INTERNAL MEDICINE

## 2019-02-19 RX ORDER — FLUCONAZOLE 100 MG/1
TABLET ORAL
Qty: 11 TABLET | Refills: 0 | Status: SHIPPED | OUTPATIENT
Start: 2019-02-19 | End: 2019-05-07

## 2019-02-19 RX ORDER — AMOXICILLIN 875 MG/1
875 TABLET, COATED ORAL 2 TIMES DAILY
Qty: 20 TABLET | Refills: 0 | Status: SHIPPED | OUTPATIENT
Start: 2019-02-19 | End: 2019-03-01

## 2019-02-19 NOTE — PROGRESS NOTES
Subjective   Sonja Sierra is a 38 y.o. female.   Pain in the ears more so left still with some sinus pressure as well.  No temperature with this and will sore throat no respiratory involvement  History of Present Illness   Laws with persistent discomfort both ears left worse than left with sinus pressure and drainage.  Sinus pressure is better but still there left ear in particular still problematic for patient but no drainage no reported temperature with this also given antibiotics has some vaginal irritation felt to be yeast infection got better but is now still having problems we will treat for potential monilial vaginitis given prolonged oral antibiotics give her another round of Diflucan.    Review of Systems   All other systems reviewed and are negative.      Objective   Vitals:    02/19/19 1620   BP: 124/88   Pulse: 71   Temp: 97.7 °F (36.5 °C)   SpO2: 93%   Weight: 95.3 kg (210 lb 3.2 oz)     Physical Exam   Constitutional: She appears well-developed.   HENT:   Head: Normocephalic and atraumatic.   Mouth/Throat: Oropharynx is clear and moist.   Right TM is retracted but not injected.  Left TM is very dull.  No injection with this minimally retracted.  No problems with the ear canal bilaterally.   Eyes: Conjunctivae are normal. Pupils are equal, round, and reactive to light.   Cardiovascular: Normal rate, regular rhythm and normal heart sounds.   Pulmonary/Chest: Effort normal and breath sounds normal.   Abdominal: Soft. Bowel sounds are normal.   Skin: Skin is warm and dry.   Vitals reviewed.      No results found for: INR    Procedures    Assessment/Plan   1.  Left otitis media plan amoxicillin 875 twice daily for 10 days time refer to Dr. Reyna for further evaluation    2.  Sinus pressure/sinusitis plan amoxicillin multislice for this have not ENT evaluation    3.  Vaginitis presumed monilial plan Diflucan 102 tabs day 1 then 1 tablet daily days 2 through 10 quality 11 follow-up or call if not well in  10 days time    Much of this encounter note is an electronic transcription/translation of spoken language to printed text.  The electronic translation of spoken language may permit erroneous, or at times, nonsensical words or phrases to be inadvertently transcribed.  Although I have reviewed the note for such errors, some may still exist. If there are questions or for further clarification, please contact me.

## 2019-05-07 ENCOUNTER — OFFICE VISIT (OUTPATIENT)
Dept: FAMILY MEDICINE CLINIC | Facility: CLINIC | Age: 39
End: 2019-05-07

## 2019-05-07 VITALS
HEIGHT: 68 IN | BODY MASS INDEX: 31.83 KG/M2 | SYSTOLIC BLOOD PRESSURE: 118 MMHG | WEIGHT: 210 LBS | HEART RATE: 69 BPM | DIASTOLIC BLOOD PRESSURE: 74 MMHG | TEMPERATURE: 98.3 F | OXYGEN SATURATION: 100 %

## 2019-05-07 DIAGNOSIS — Z11.3 SCREEN FOR STD (SEXUALLY TRANSMITTED DISEASE): ICD-10-CM

## 2019-05-07 DIAGNOSIS — N89.8 VAGINA ITCHING: Primary | ICD-10-CM

## 2019-05-07 LAB
AMORPH URATE CRY URNS QL MICRO: ABNORMAL /HPF
BACTERIA UR QL AUTO: ABNORMAL /HPF
BILIRUB UR QL STRIP: NEGATIVE
CLARITY UR: ABNORMAL
CLUE CELLS SPEC QL WET PREP: NORMAL
COLOR UR: YELLOW
GLUCOSE UR STRIP-MCNC: NEGATIVE MG/DL
HGB UR QL STRIP.AUTO: NEGATIVE
HYDATID CYST SPEC WET PREP: NORMAL
KETONES UR QL STRIP: NEGATIVE
LEUKOCYTE ESTERASE UR QL STRIP.AUTO: NEGATIVE
NITRITE UR QL STRIP: NEGATIVE
PH UR STRIP.AUTO: 7.5 [PH] (ref 4.6–8)
PROT UR QL STRIP: NEGATIVE
RBC # UR: ABNORMAL /HPF
REF LAB TEST METHOD: ABNORMAL
SP GR UR STRIP: 1.01 (ref 1–1.03)
SQUAMOUS #/AREA URNS HPF: ABNORMAL /HPF
T VAGINALIS SPEC QL WET PREP: NORMAL
UROBILINOGEN UR QL STRIP: ABNORMAL
WBC SPEC QL WET PREP: NORMAL
WBC UR QL AUTO: ABNORMAL /HPF
YEAST GENITAL QL WET PREP: NORMAL

## 2019-05-07 PROCEDURE — 87210 SMEAR WET MOUNT SALINE/INK: CPT | Performed by: NURSE PRACTITIONER

## 2019-05-07 PROCEDURE — 81001 URINALYSIS AUTO W/SCOPE: CPT | Performed by: NURSE PRACTITIONER

## 2019-05-07 PROCEDURE — 99213 OFFICE O/P EST LOW 20 MIN: CPT | Performed by: NURSE PRACTITIONER

## 2019-05-07 RX ORDER — IBUPROFEN 800 MG/1
TABLET ORAL
COMMUNITY
Start: 2019-04-25 | End: 2020-04-22

## 2019-05-07 RX ORDER — FLUCONAZOLE 150 MG/1
150 TABLET ORAL ONCE
Qty: 1 TABLET | Refills: 0 | Status: SHIPPED | OUTPATIENT
Start: 2019-05-07 | End: 2019-05-07

## 2019-05-07 NOTE — PROGRESS NOTES
Subjective   Sonja Sierra is a 39 y.o. female.     History of Present Illness   C/o vag itching x 1 wk, no vag odor, pain, dc or bleeding, no dysuria, with BF but found out he was cheating on her, Sees GYN Dr Osmel Steen s/t total abd hyst for fibroid and heavy menses 05/07/18 states had HIV screen within the year but request update STD screening    The following portions of the patient's history were reviewed and updated as appropriate: allergies, current medications, past family history, past medical history, past social history, past surgical history and problem list.    Review of Systems   Constitutional: Negative for fever.   Respiratory: Negative for cough, shortness of breath and wheezing.    Cardiovascular: Negative for chest pain.   Genitourinary: Negative for decreased urine volume, difficulty urinating, dyspareunia, dysuria, enuresis, flank pain, frequency, genital sores, hematuria, menstrual problem, pelvic pain, urgency, vaginal bleeding, vaginal discharge (but itching) and vaginal pain.   Neurological: Negative for dizziness and headaches.   All other systems reviewed and are negative.      Objective   Physical Exam   Constitutional: She is oriented to person, place, and time. She appears well-developed and well-nourished.   HENT:   Head: Normocephalic and atraumatic.   Eyes: Conjunctivae and EOM are normal. Pupils are equal, round, and reactive to light.   Cardiovascular: Normal rate, regular rhythm and normal heart sounds.   Pulmonary/Chest: Effort normal and breath sounds normal.   Genitourinary: Vaginal discharge found.   Musculoskeletal: Normal range of motion.   Neurological: She is alert and oriented to person, place, and time.   Skin: Skin is warm and dry.   Psychiatric: She has a normal mood and affect. Her behavior is normal. Judgment and thought content normal.   Vitals reviewed.      Assessment/Plan   Sonja was seen today for exposure to std.    Diagnoses and all orders for this  visit:    Vagina itching  -     Wet Prep, Genital - Swab, Vagina  -     Urinalysis With Microscopic - Urine, Clean Catch  -     Urinalysis without microscopic (no culture) - Urine, Clean Catch  -     Urinalysis, Microscopic Only - Urine, Clean Catch    Screen for STD (sexually transmitted disease)  -     Chlamydia trachomatis, Neisseria gonorrhoeae, PCR - , Cervix  -     Cancel: HIV-1 / O / 2 Ag / Antibody 4th Generation  -     Cancel: HSV 2 Antibody, IgG  -     Cancel: HSV 1 Antibody, IgG  -     Cancel: RPR    Other orders  -     fluconazole (DIFLUCAN) 150 MG tablet; Take 1 tablet by mouth 1 (One) Time for 1 dose. One PO X 1 dose for yeast infection may repeat x 1 after 3 days    wet prep today, check labs and call with results, defer HIV, RPR and HSV screening, erx diflucan 150 mg 1 PO today

## 2019-05-07 NOTE — PATIENT INSTRUCTIONS
wet prep today, check labs and call with results, defer HIV, RPR and HSV screening, erx diflucan 150 mg 1 PO today

## 2019-05-09 LAB
C TRACH RRNA SPEC DONR QL NAA+PROBE: NEGATIVE
N GONORRHOEA DNA SPEC QL NAA+PROBE: NEGATIVE

## 2019-05-09 RX ORDER — TRIAMTERENE AND HYDROCHLOROTHIAZIDE 37.5; 25 MG/1; MG/1
CAPSULE ORAL
Qty: 30 CAPSULE | Refills: 0 | Status: SHIPPED | OUTPATIENT
Start: 2019-05-09 | End: 2019-05-09 | Stop reason: SDUPTHER

## 2019-05-09 RX ORDER — TRIAMTERENE AND HYDROCHLOROTHIAZIDE 37.5; 25 MG/1; MG/1
CAPSULE ORAL
Qty: 30 CAPSULE | Refills: 0 | Status: SHIPPED | OUTPATIENT
Start: 2019-05-09 | End: 2019-06-03 | Stop reason: SDUPTHER

## 2019-05-10 ENCOUNTER — TELEPHONE (OUTPATIENT)
Dept: FAMILY MEDICINE CLINIC | Facility: CLINIC | Age: 39
End: 2019-05-10

## 2019-05-10 RX ORDER — FLUCONAZOLE 150 MG/1
150 TABLET ORAL ONCE
Qty: 1 TABLET | Refills: 0 | Status: SHIPPED | OUTPATIENT
Start: 2019-05-10 | End: 2019-05-10

## 2019-05-10 NOTE — TELEPHONE ENCOUNTER
[facility]    ----- Message from STEPHANIE Trevino sent at 5/10/2019  8:12 AM EDT -----  Chlamydia and gonorrhea negative, monitor symptoms after taking diflucan

## 2019-05-10 NOTE — TELEPHONE ENCOUNTER
[facility]  ----- Message from STEPHANIE Trevino sent at 5/10/2019  8:12 AM EDT -----  Chlamydia and gonorrhea negative, monitor symptoms after taking diflucan

## 2019-05-10 NOTE — TELEPHONE ENCOUNTER
She took the diflucan the day she was here and still has a itch    ----- Message from STEPHANIE Trevino sent at 5/10/2019  8:12 AM EDT -----  Chlamydia and gonorrhea negative, monitor symptoms after taking diflucan

## 2019-06-03 RX ORDER — TRIAMTERENE AND HYDROCHLOROTHIAZIDE 37.5; 25 MG/1; MG/1
CAPSULE ORAL
Qty: 90 CAPSULE | Refills: 0 | Status: SHIPPED | OUTPATIENT
Start: 2019-06-03 | End: 2019-09-05 | Stop reason: SDUPTHER

## 2019-09-05 RX ORDER — TRIAMTERENE AND HYDROCHLOROTHIAZIDE 37.5; 25 MG/1; MG/1
CAPSULE ORAL
Qty: 90 CAPSULE | Refills: 0 | Status: SHIPPED | OUTPATIENT
Start: 2019-09-05 | End: 2019-12-31

## 2019-10-28 ENCOUNTER — OFFICE VISIT (OUTPATIENT)
Dept: FAMILY MEDICINE CLINIC | Facility: CLINIC | Age: 39
End: 2019-10-28

## 2019-10-28 VITALS
DIASTOLIC BLOOD PRESSURE: 66 MMHG | HEIGHT: 68 IN | BODY MASS INDEX: 30.77 KG/M2 | WEIGHT: 203 LBS | OXYGEN SATURATION: 93 % | SYSTOLIC BLOOD PRESSURE: 118 MMHG | HEART RATE: 85 BPM | TEMPERATURE: 97.7 F

## 2019-10-28 DIAGNOSIS — R79.89 LOW VITAMIN D LEVEL: Primary | ICD-10-CM

## 2019-10-28 DIAGNOSIS — J30.2 SEASONAL ALLERGIES: ICD-10-CM

## 2019-10-28 DIAGNOSIS — D64.9 ANEMIA, UNSPECIFIED TYPE: ICD-10-CM

## 2019-10-28 DIAGNOSIS — R53.83 FATIGUE, UNSPECIFIED TYPE: ICD-10-CM

## 2019-10-28 LAB
25(OH)D3 SERPL-MCNC: 59.5 NG/ML (ref 30–100)
ERYTHROCYTE [DISTWIDTH] IN BLOOD BY AUTOMATED COUNT: 13.3 % (ref 12.3–15.4)
FERRITIN SERPL-MCNC: 57.3 NG/ML (ref 13–150)
FOLATE SERPL-MCNC: 8.63 NG/ML (ref 4.78–24.2)
HCT VFR BLD AUTO: 38.7 % (ref 34–46.6)
HGB BLD-MCNC: 12.4 G/DL (ref 12–15.9)
IRON 24H UR-MRATE: 58 MCG/DL (ref 37–145)
IRON SATN MFR SERPL: 17 % (ref 20–50)
LYMPHOCYTES # BLD AUTO: 2.3 10*3/MM3 (ref 0.7–3.1)
LYMPHOCYTES NFR BLD AUTO: 35.9 % (ref 19.6–45.3)
MCH RBC QN AUTO: 28.5 PG (ref 26.6–33)
MCHC RBC AUTO-ENTMCNC: 32.1 G/DL (ref 31.5–35.7)
MCV RBC AUTO: 88.9 FL (ref 79–97)
MONOCYTES # BLD AUTO: 0.4 10*3/MM3 (ref 0.1–0.9)
MONOCYTES NFR BLD AUTO: 7 % (ref 5–12)
NEUTROPHILS # BLD AUTO: 3.7 10*3/MM3 (ref 1.7–7)
NEUTROPHILS NFR BLD AUTO: 57.1 % (ref 42.7–76)
PLATELET # BLD AUTO: 316 10*3/MM3 (ref 140–450)
PMV BLD AUTO: 6.6 FL (ref 6–12)
RBC # BLD AUTO: 4.35 10*6/MM3 (ref 3.77–5.28)
TIBC SERPL-MCNC: 334 MCG/DL (ref 298–536)
TRANSFERRIN SERPL-MCNC: 224 MG/DL (ref 200–360)
VIT B12 BLD-MCNC: 307 PG/ML (ref 211–946)
WBC NRBC COR # BLD: 6.4 10*3/MM3 (ref 3.4–10.8)

## 2019-10-28 PROCEDURE — 85025 COMPLETE CBC W/AUTO DIFF WBC: CPT | Performed by: NURSE PRACTITIONER

## 2019-10-28 PROCEDURE — 83540 ASSAY OF IRON: CPT | Performed by: NURSE PRACTITIONER

## 2019-10-28 PROCEDURE — 84466 ASSAY OF TRANSFERRIN: CPT | Performed by: NURSE PRACTITIONER

## 2019-10-28 PROCEDURE — 82607 VITAMIN B-12: CPT | Performed by: NURSE PRACTITIONER

## 2019-10-28 PROCEDURE — 82306 VITAMIN D 25 HYDROXY: CPT | Performed by: NURSE PRACTITIONER

## 2019-10-28 PROCEDURE — 99213 OFFICE O/P EST LOW 20 MIN: CPT | Performed by: NURSE PRACTITIONER

## 2019-10-28 PROCEDURE — 82746 ASSAY OF FOLIC ACID SERUM: CPT | Performed by: NURSE PRACTITIONER

## 2019-10-28 PROCEDURE — 36415 COLL VENOUS BLD VENIPUNCTURE: CPT | Performed by: NURSE PRACTITIONER

## 2019-10-28 PROCEDURE — 82728 ASSAY OF FERRITIN: CPT | Performed by: NURSE PRACTITIONER

## 2019-10-28 RX ORDER — FLUTICASONE PROPIONATE 50 MCG
2 SPRAY, SUSPENSION (ML) NASAL DAILY
Qty: 1 BOTTLE | Refills: 12 | Status: SHIPPED | OUTPATIENT
Start: 2019-10-28 | End: 2019-12-16 | Stop reason: SDUPTHER

## 2019-10-28 RX ORDER — FLUTICASONE PROPIONATE 50 MCG
2 SPRAY, SUSPENSION (ML) NASAL DAILY
Qty: 1 BOTTLE | Refills: 12 | Status: SHIPPED | OUTPATIENT
Start: 2019-10-28 | End: 2019-10-28 | Stop reason: SDUPTHER

## 2019-10-28 RX ORDER — AMOXICILLIN 875 MG/1
875 TABLET, COATED ORAL 2 TIMES DAILY
Qty: 14 TABLET | Refills: 0 | Status: SHIPPED | OUTPATIENT
Start: 2019-10-28 | End: 2019-11-04

## 2019-10-28 RX ORDER — AMOXICILLIN 875 MG/1
875 TABLET, COATED ORAL 2 TIMES DAILY
Qty: 14 TABLET | Refills: 0 | Status: SHIPPED | OUTPATIENT
Start: 2019-10-28 | End: 2019-10-28 | Stop reason: SDUPTHER

## 2019-10-28 NOTE — PATIENT INSTRUCTIONS
Start amoxicillin 875mg bid for 7 days.   flonase 2 sprays each nostril daily,   Cont claritin as needed,   Labs today will call with results.   If symptoms persist 5-7 days call office   Increase fluid intake, get plenty of rest.   Patient agrees with plan of care and understands instructions. Call if worsening symptoms or any problems or concerns.

## 2019-10-30 ENCOUNTER — TELEPHONE (OUTPATIENT)
Dept: FAMILY MEDICINE CLINIC | Facility: CLINIC | Age: 39
End: 2019-10-30

## 2019-11-05 ENCOUNTER — TELEPHONE (OUTPATIENT)
Dept: FAMILY MEDICINE CLINIC | Facility: CLINIC | Age: 39
End: 2019-11-05

## 2019-11-05 RX ORDER — FLUCONAZOLE 150 MG/1
150 TABLET ORAL ONCE
Qty: 1 TABLET | Refills: 0 | Status: SHIPPED | OUTPATIENT
Start: 2019-11-05 | End: 2019-11-05

## 2019-11-07 ENCOUNTER — TELEPHONE (OUTPATIENT)
Dept: FAMILY MEDICINE CLINIC | Facility: CLINIC | Age: 39
End: 2019-11-07

## 2019-11-07 NOTE — TELEPHONE ENCOUNTER
SHE WAS IN LAST WEEK AND IS STILL HAVING SYMPTOMS OF A SINUS INFECTION. CAN YOU SEND SOMETHING ELSE IN? [address]

## 2019-11-11 ENCOUNTER — TELEPHONE (OUTPATIENT)
Dept: FAMILY MEDICINE CLINIC | Facility: CLINIC | Age: 39
End: 2019-11-11

## 2019-11-11 NOTE — TELEPHONE ENCOUNTER
DOES NOT UNDERSTAND WHY SHE NEEDS TO BE SEEN, CAN SOMETHING ELSE NOT BE CALLED IN? THEY OFFERED A Z PACK BUT SHE IS ALLERGIC. SHE USES [employer].

## 2019-11-11 NOTE — TELEPHONE ENCOUNTER
You had me ask pt if she has ever had zpack before she said she was allergic, now pt is confused on why you wont send something else in

## 2019-11-11 NOTE — TELEPHONE ENCOUNTER
Attempted to call patient at 12:55pm, need more information about symptoms, if she is still having symptoms may need to be seen again in office to rule out infection and let me exam her again. She has several allergies to medications. Need more information regarding symptoms before can call in medication.

## 2019-12-16 ENCOUNTER — OFFICE VISIT (OUTPATIENT)
Dept: FAMILY MEDICINE CLINIC | Facility: CLINIC | Age: 39
End: 2019-12-16

## 2019-12-16 VITALS
OXYGEN SATURATION: 99 % | TEMPERATURE: 98.3 F | DIASTOLIC BLOOD PRESSURE: 70 MMHG | HEIGHT: 68 IN | BODY MASS INDEX: 30.31 KG/M2 | SYSTOLIC BLOOD PRESSURE: 126 MMHG | WEIGHT: 200 LBS | HEART RATE: 70 BPM

## 2019-12-16 DIAGNOSIS — H92.03 OTALGIA OF BOTH EARS: ICD-10-CM

## 2019-12-16 DIAGNOSIS — J30.9 ALLERGIC RHINITIS, UNSPECIFIED SEASONALITY, UNSPECIFIED TRIGGER: ICD-10-CM

## 2019-12-16 DIAGNOSIS — J01.01 ACUTE RECURRENT MAXILLARY SINUSITIS: Primary | ICD-10-CM

## 2019-12-16 PROCEDURE — 99213 OFFICE O/P EST LOW 20 MIN: CPT | Performed by: NURSE PRACTITIONER

## 2019-12-16 RX ORDER — LEVOFLOXACIN 500 MG/1
500 TABLET, FILM COATED ORAL DAILY
Qty: 7 TABLET | Refills: 0 | Status: SHIPPED | OUTPATIENT
Start: 2019-12-16 | End: 2020-03-09

## 2019-12-16 RX ORDER — FLUTICASONE PROPIONATE 50 MCG
2 SPRAY, SUSPENSION (ML) NASAL DAILY
Qty: 1 BOTTLE | Refills: 12 | Status: SHIPPED | OUTPATIENT
Start: 2019-12-16 | End: 2020-04-10 | Stop reason: SDUPTHER

## 2019-12-16 NOTE — PATIENT INSTRUCTIONS
Trial claritin D daily in am, flonase nightly as needed for drainage.   Start levaquin 500mg daily in am, SE explained, take with food, call with problems,   If symptoms persist 5-7 days call office will consider ent or allergy if needed.   May try mucinex OTC as needed for congestion.   Increase fluid intake, get plenty of rest.   Patient agrees with plan of care and understands instructions. Call if worsening symptoms or any problems or concerns.

## 2019-12-16 NOTE — PROGRESS NOTES
Subjective   Sonja Sierra is a 39 y.o. female.     History of Present Illness   C/o sinus pressure, HA, ear pressure, she was seen 10/28/19, given amoxicillin, still having sinus pressure, bilat ear pain, no improvement in symptoms, denies fever, she states ear pain now worse. She is taking  flonase, she denies fever, she did have sore throat, tried sarbjit selzter OTC. She has sneezing, does have cough, she has sinus pressure. She did sees allergist in the past thinks saw this year. Did allergy testing but no shots.         The following portions of the patient's history were reviewed and updated as appropriate: allergies, current medications, past family history, past medical history, past social history, past surgical history and problem list.    Review of Systems   Constitutional: Negative for chills, diaphoresis and fever.   HENT: Positive for congestion, ear pain, sinus pressure and sneezing. Negative for postnasal drip, rhinorrhea and sore throat.    Respiratory: Negative for cough and shortness of breath.    Cardiovascular: Negative for chest pain.   Musculoskeletal: Negative for arthralgias and myalgias.   Allergic/Immunologic: Positive for environmental allergies.   Neurological: Positive for headache. Negative for dizziness and light-headedness.   All other systems reviewed and are negative.      Objective   Physical Exam   Constitutional: She is oriented to person, place, and time. She appears well-developed and well-nourished.   HENT:   Head: Normocephalic.   Right Ear: Ear canal normal. There is drainage. Tympanic membrane is erythematous. A middle ear effusion is present.   Left Ear: Ear canal normal. A middle ear effusion is present.   Nose: Mucosal edema present. Right sinus exhibits maxillary sinus tenderness. Left sinus exhibits maxillary sinus tenderness.   Mouth/Throat: Uvula is midline and mucous membranes are normal. Posterior oropharyngeal erythema present.   Eyes: Pupils are equal, round, and  reactive to light.   Neck: Normal range of motion.   Cardiovascular: Normal rate, regular rhythm and normal heart sounds.   Pulmonary/Chest: Effort normal and breath sounds normal. No respiratory distress. She has no decreased breath sounds. She has no wheezes.   Musculoskeletal: Normal range of motion.   Lymphadenopathy:     She has no cervical adenopathy.   Neurological: She is alert and oriented to person, place, and time.   Skin: Skin is warm and dry.   Psychiatric: She has a normal mood and affect. Her behavior is normal.   Nursing note and vitals reviewed.        Assessment/Plan   Sonja was seen today for facial pain, headache and ear fullness.    Diagnoses and all orders for this visit:    Acute recurrent maxillary sinusitis    Allergic rhinitis, unspecified seasonality, unspecified trigger    Otalgia of both ears    Other orders  -     loratadine-pseudoephedrine (CLARITIN-D 12 HOUR) 5-120 MG per 12 hr tablet; Take 1 tablet by mouth Daily As Needed for Allergies.  -     levoFLOXacin (LEVAQUIN) 500 MG tablet; Take 1 tablet by mouth Daily.  -     fluticasone (FLONASE) 50 MCG/ACT nasal spray; 2 sprays into the nostril(s) as directed by provider Daily.      Trial claritin D daily in am, flonase nightly as needed for drainage.   Start levaquin 500mg daily in am, SE explained, take with food, call with problems,   If symptoms persist 5-7 days call office will consider ent or allergy if needed.   May try mucinex OTC as needed for congestion.   Increase fluid intake, get plenty of rest.   Patient agrees with plan of care and understands instructions. Call if worsening symptoms or any problems or concerns.

## 2019-12-31 RX ORDER — TRIAMTERENE AND HYDROCHLOROTHIAZIDE 37.5; 25 MG/1; MG/1
CAPSULE ORAL
Qty: 90 CAPSULE | Refills: 0 | Status: SHIPPED | OUTPATIENT
Start: 2019-12-31 | End: 2022-04-07

## 2020-03-09 ENCOUNTER — OFFICE VISIT (OUTPATIENT)
Dept: FAMILY MEDICINE CLINIC | Facility: CLINIC | Age: 40
End: 2020-03-09

## 2020-03-09 VITALS
TEMPERATURE: 98.2 F | HEART RATE: 75 BPM | WEIGHT: 194 LBS | BODY MASS INDEX: 29.4 KG/M2 | OXYGEN SATURATION: 98 % | HEIGHT: 68 IN | DIASTOLIC BLOOD PRESSURE: 66 MMHG | SYSTOLIC BLOOD PRESSURE: 120 MMHG

## 2020-03-09 DIAGNOSIS — H66.002 ACUTE SUPPURATIVE OTITIS MEDIA OF LEFT EAR WITHOUT SPONTANEOUS RUPTURE OF TYMPANIC MEMBRANE, RECURRENCE NOT SPECIFIED: Primary | ICD-10-CM

## 2020-03-09 DIAGNOSIS — J30.9 ALLERGIC RHINITIS, UNSPECIFIED SEASONALITY, UNSPECIFIED TRIGGER: ICD-10-CM

## 2020-03-09 PROCEDURE — 99213 OFFICE O/P EST LOW 20 MIN: CPT | Performed by: NURSE PRACTITIONER

## 2020-03-09 RX ORDER — FLUCONAZOLE 150 MG/1
TABLET ORAL
Qty: 3 TABLET | Refills: 0 | Status: SHIPPED | OUTPATIENT
Start: 2020-03-09 | End: 2020-03-18 | Stop reason: SDUPTHER

## 2020-03-09 RX ORDER — AMOXICILLIN AND CLAVULANATE POTASSIUM 875; 125 MG/1; MG/1
1 TABLET, FILM COATED ORAL 2 TIMES DAILY
Qty: 14 TABLET | Refills: 0 | Status: SHIPPED | OUTPATIENT
Start: 2020-03-09 | End: 2020-03-16

## 2020-03-09 RX ORDER — LANOLIN ALCOHOL/MO/W.PET/CERES
1000 CREAM (GRAM) TOPICAL DAILY
COMMUNITY
End: 2022-04-07

## 2020-03-09 NOTE — PATIENT INSTRUCTIONS
Start augmentin 875-125mg bid for 7 days,   Cont claritin D in am, flonase in pm, mucinex bid as needed for drainage.   Diflucan 150mg x1 today then repeat again in 3 days x2 for hx of yeast infection with abx.   If symptoms persist 5-7 days call office.   Increase fluid intake, get plenty of rest.   Patient agrees with plan of care and understands instructions. Call if worsening symptoms or any problems or concerns.

## 2020-03-09 NOTE — PROGRESS NOTES
Subjective   Sonja Sierra is a 39 y.o. female.     History of Present Illness   C/o sinus pressure, ear fullness, started about 4 days ago, denies fever, denies cough, denies sore throat. She does HA, has bilat ear pain, she tried cold and sinus med OTC, she does use claritin D, also using flonase.       The following portions of the patient's history were reviewed and updated as appropriate: allergies, current medications, past family history, past medical history, past social history, past surgical history and problem list.    Review of Systems   Constitutional: Negative for chills, diaphoresis and fever.   HENT: Positive for ear pain, rhinorrhea and sinus pressure. Negative for sore throat.    Respiratory: Negative for cough and shortness of breath.    Cardiovascular: Negative for chest pain.   Musculoskeletal: Negative for arthralgias and myalgias.   Allergic/Immunologic: Positive for environmental allergies.   Neurological: Negative for dizziness, light-headedness and headache.   All other systems reviewed and are negative.      Objective   Physical Exam   Constitutional: She is oriented to person, place, and time. She appears well-developed and well-nourished.   HENT:   Head: Normocephalic.   Right Ear: Ear canal normal. A middle ear effusion is present.   Left Ear: Ear canal normal. Tympanic membrane is erythematous. Tympanic membrane mobility is abnormal. A middle ear effusion is present.   Nose: Mucosal edema present. Right sinus exhibits frontal sinus tenderness. Left sinus exhibits frontal sinus tenderness.   Mouth/Throat: Uvula is midline and mucous membranes are normal. Posterior oropharyngeal erythema present.   Eyes: Pupils are equal, round, and reactive to light.   Neck: Normal range of motion.   Cardiovascular: Normal rate, regular rhythm and normal heart sounds.   Pulmonary/Chest: Effort normal and breath sounds normal. No respiratory distress. She has no decreased breath sounds. She has no  wheezes. She has no rhonchi.   Musculoskeletal: Normal range of motion.   Lymphadenopathy:     She has no cervical adenopathy.   Neurological: She is alert and oriented to person, place, and time.   Skin: Skin is warm and dry.   Psychiatric: She has a normal mood and affect. Her behavior is normal.   Nursing note and vitals reviewed.        Assessment/Plan   Sonja was seen today for ear fullness and sinus pressure.    Diagnoses and all orders for this visit:    Acute suppurative otitis media of left ear without spontaneous rupture of tympanic membrane, recurrence not specified    Allergic rhinitis, unspecified seasonality, unspecified trigger    Other orders  -     amoxicillin-clavulanate (AUGMENTIN) 875-125 MG per tablet; Take 1 tablet by mouth 2 (Two) Times a Day for 7 days.  -     fluconazole (DIFLUCAN) 150 MG tablet; Take 1 tablet today then repeat again in 3 days x2.      Start augmentin 875-125mg bid for 7 days,   Cont claritin D in am, flonase in pm, mucinex bid as needed for drainage.   Diflucan 150mg x1 today then repeat again in 3 days x2 for hx of yeast infection with abx.   If symptoms persist 5-7 days call office.   Increase fluid intake, get plenty of rest.   Patient agrees with plan of care and understands instructions. Call if worsening symptoms or any problems or concerns.

## 2020-03-10 ENCOUNTER — TELEPHONE (OUTPATIENT)
Dept: FAMILY MEDICINE CLINIC | Facility: CLINIC | Age: 40
End: 2020-03-10

## 2020-03-17 ENCOUNTER — TELEPHONE (OUTPATIENT)
Dept: FAMILY MEDICINE CLINIC | Facility: CLINIC | Age: 40
End: 2020-03-17

## 2020-03-18 RX ORDER — LEVOFLOXACIN 500 MG/1
500 TABLET, FILM COATED ORAL DAILY
Qty: 7 TABLET | Refills: 0 | Status: SHIPPED | OUTPATIENT
Start: 2020-03-18 | End: 2020-04-10

## 2020-03-18 RX ORDER — FLUCONAZOLE 150 MG/1
150 TABLET ORAL ONCE
Qty: 1 TABLET | Refills: 0 | Status: SHIPPED | OUTPATIENT
Start: 2020-03-18 | End: 2020-03-18

## 2020-04-09 ENCOUNTER — TELEPHONE (OUTPATIENT)
Dept: FAMILY MEDICINE CLINIC | Facility: CLINIC | Age: 40
End: 2020-04-09

## 2020-04-09 NOTE — TELEPHONE ENCOUNTER
PT CALLED SAYING THAT SHE WAS SEEN ON 3/9 AND WAS TOLD THAT SHE HAD FLUID IN HER EARS. SHE SAID THAT SHE STILL FEELS LIKE SHE STILL HAS IT IN THERE AND WOULD LIKE TO KNOW SHE SHOULD DO?    DENNISE [address]SH[address] [place], [place]

## 2020-04-09 NOTE — TELEPHONE ENCOUNTER
Called and left message for patient to return call. HUB please ask the patient if augmentin helped at all?

## 2020-04-10 ENCOUNTER — TELEPHONE (OUTPATIENT)
Dept: FAMILY MEDICINE CLINIC | Facility: CLINIC | Age: 40
End: 2020-04-10

## 2020-04-10 RX ORDER — FLUCONAZOLE 150 MG/1
150 TABLET ORAL ONCE
Qty: 1 TABLET | Refills: 0 | Status: SHIPPED | OUTPATIENT
Start: 2020-04-10 | End: 2020-04-10

## 2020-04-10 RX ORDER — GUAIFENESIN 600 MG/1
1200 TABLET, EXTENDED RELEASE ORAL 2 TIMES DAILY
Qty: 60 TABLET | Refills: 1 | Status: SHIPPED | OUTPATIENT
Start: 2020-04-10 | End: 2020-04-22 | Stop reason: SDUPTHER

## 2020-04-10 RX ORDER — CLINDAMYCIN HYDROCHLORIDE 300 MG/1
300 CAPSULE ORAL 3 TIMES DAILY
Qty: 30 CAPSULE | Refills: 0 | Status: SHIPPED | OUTPATIENT
Start: 2020-04-10 | End: 2020-04-22

## 2020-04-10 RX ORDER — FLUTICASONE PROPIONATE 50 MCG
2 SPRAY, SUSPENSION (ML) NASAL DAILY
Qty: 1 BOTTLE | Refills: 12 | Status: SHIPPED | OUTPATIENT
Start: 2020-04-10 | End: 2020-11-19 | Stop reason: SDUPTHER

## 2020-04-10 NOTE — TELEPHONE ENCOUNTER
PT CALLED RETURNING A CALL. PT STATES THE AUGMENTIN HELPED BUT PTS EARS ARE STILL CLOGGED BUT HAVEN'T ACHED UNTIL LAST NIGHT WHEN IT STARTED ACHING AGAIN.     PLEASE ADVISE     PT CALL BACK   [phone]

## 2020-04-10 NOTE — TELEPHONE ENCOUNTER
PATIENT RETURNING THE DOCTORS CALL PATIENT CAN NOT HAVE HER PHONE AT WORK PLEASE LEAVE A MESSAGE ON HER VOICEMAIL

## 2020-04-10 NOTE — TELEPHONE ENCOUNTER
Spoke with patient, still having ear pain, will try regimen of claritin D in am, flonase in pm, mucinex bid, trial clindamycin 300mg tid for 10 days, diflucan for hx of yeast infection with abx, if symptoms persist in 1 week call office will refer to ENT,

## 2020-04-21 ENCOUNTER — TELEPHONE (OUTPATIENT)
Dept: FAMILY MEDICINE CLINIC | Facility: CLINIC | Age: 40
End: 2020-04-21

## 2020-04-22 ENCOUNTER — OFFICE VISIT (OUTPATIENT)
Dept: FAMILY MEDICINE CLINIC | Facility: CLINIC | Age: 40
End: 2020-04-22

## 2020-04-22 VITALS
TEMPERATURE: 97.8 F | OXYGEN SATURATION: 99 % | SYSTOLIC BLOOD PRESSURE: 120 MMHG | WEIGHT: 185 LBS | DIASTOLIC BLOOD PRESSURE: 70 MMHG | HEART RATE: 71 BPM | BODY MASS INDEX: 28.04 KG/M2 | HEIGHT: 68 IN

## 2020-04-22 DIAGNOSIS — G89.29 CHRONIC PAIN OF BOTH KNEES: ICD-10-CM

## 2020-04-22 DIAGNOSIS — T14.8XXA HEMATOMA: ICD-10-CM

## 2020-04-22 DIAGNOSIS — M25.561 CHRONIC PAIN OF BOTH KNEES: ICD-10-CM

## 2020-04-22 DIAGNOSIS — H92.03 OTALGIA, BILATERAL: Primary | ICD-10-CM

## 2020-04-22 DIAGNOSIS — J30.9 ALLERGIC RHINITIS, UNSPECIFIED SEASONALITY, UNSPECIFIED TRIGGER: ICD-10-CM

## 2020-04-22 DIAGNOSIS — H65.93 MIDDLE EAR EFFUSION, BILATERAL: ICD-10-CM

## 2020-04-22 DIAGNOSIS — M25.562 CHRONIC PAIN OF BOTH KNEES: ICD-10-CM

## 2020-04-22 PROCEDURE — 99213 OFFICE O/P EST LOW 20 MIN: CPT | Performed by: NURSE PRACTITIONER

## 2020-04-22 RX ORDER — MELOXICAM 15 MG/1
15 TABLET ORAL DAILY
Qty: 30 TABLET | Refills: 3 | Status: SHIPPED | OUTPATIENT
Start: 2020-04-22 | End: 2021-05-05

## 2020-04-22 RX ORDER — GUAIFENESIN 600 MG/1
1200 TABLET, EXTENDED RELEASE ORAL 2 TIMES DAILY
Qty: 60 TABLET | Refills: 1 | Status: SHIPPED | OUTPATIENT
Start: 2020-04-22 | End: 2020-11-19 | Stop reason: SDUPTHER

## 2020-04-22 RX ORDER — AMOXICILLIN 875 MG/1
875 TABLET, COATED ORAL 2 TIMES DAILY
Qty: 14 TABLET | Refills: 0 | Status: SHIPPED | OUTPATIENT
Start: 2020-04-22 | End: 2020-04-29

## 2020-04-22 RX ORDER — FLUCONAZOLE 150 MG/1
150 TABLET ORAL ONCE
Qty: 1 TABLET | Refills: 0 | Status: SHIPPED | OUTPATIENT
Start: 2020-04-22 | End: 2020-04-22

## 2020-04-22 NOTE — PROGRESS NOTES
Subjective   Sonja Sierra is a 39 y.o. female.     History of Present Illness   C/o HA sinus pressure, ear pressure, she was seen 3/9/2020 for AOM, given augmentin, taking claritin D and flonase, also given levaquin, symptoms persisted, then given clindamycin, she also tried mucinex for drainage. States she still has ear pain, she states ear pain improved but still feels clogged, also has HA. States 2 days ago she had HA, sinus pressure, took OTC allergy meds. Worse with bending forward. She took sudafed. She does see allergist, also saw ENT last year. States symptoms are seasonal, denies fever. States prev took amoxicillin which helped. Denies drainage, PND, she denies nasal congestion.   Also c/o bilat knee swelling, hx of arthritis, she noticed bruising on right medial knee about 2-3 days ago, she states was sore to touch, denies any injury, she does work.         The following portions of the patient's history were reviewed and updated as appropriate: allergies, current medications, past family history, past medical history, past social history, past surgical history and problem list.    Review of Systems   Constitutional: Negative for chills, diaphoresis and fever.   HENT: Positive for ear pain and sinus pressure. Negative for ear discharge, postnasal drip and sore throat.    Respiratory: Negative for cough and shortness of breath.    Cardiovascular: Negative for chest pain.   Musculoskeletal: Negative for arthralgias and myalgias.   Allergic/Immunologic: Positive for environmental allergies.   Neurological: Positive for headache. Negative for dizziness and light-headedness.   All other systems reviewed and are negative.      Objective   Physical Exam   Constitutional: She is oriented to person, place, and time. She appears well-developed and well-nourished.   HENT:   Head: Normocephalic.   Right Ear: Ear canal normal. A middle ear effusion is present.   Left Ear: Ear canal normal. A middle ear effusion is  present.   Nose: Mucosal edema present.   Mouth/Throat: Uvula is midline, oropharynx is clear and moist and mucous membranes are normal.   Eyes: Pupils are equal, round, and reactive to light.   Neck: Normal range of motion.   Cardiovascular: Normal rate, regular rhythm and normal heart sounds.   Pulmonary/Chest: Effort normal and breath sounds normal.   Musculoskeletal: Normal range of motion.        Right knee: She exhibits swelling and ecchymosis. She exhibits normal range of motion.        Left knee: She exhibits normal range of motion and no swelling. No tenderness found.        Legs:  Lymphadenopathy:     She has no cervical adenopathy.   Neurological: She is alert and oriented to person, place, and time.   Skin: Skin is warm and dry.   Psychiatric: She has a normal mood and affect. Her behavior is normal.   Nursing note and vitals reviewed.        Assessment/Plan   Sonja was seen today for headache, sinusitis and ear fullness.    Diagnoses and all orders for this visit:    Otalgia, bilateral    Middle ear effusion, bilateral    Allergic rhinitis, unspecified seasonality, unspecified trigger    Chronic pain of both knees    Hematoma    Other orders  -     meloxicam (Mobic) 15 MG tablet; Take 1 tablet by mouth Daily.  -     guaiFENesin (Mucinex) 600 MG 12 hr tablet; Take 2 tablets by mouth 2 (Two) Times a Day.  -     amoxicillin (AMOXIL) 875 MG tablet; Take 1 tablet by mouth 2 (Two) Times a Day for 7 days.  -     fluconazole (Diflucan) 150 MG tablet; Take 1 tablet by mouth 1 (One) Time for 1 dose.        Cont claritin D in am, flonase daily in pm,   mucinex 2x day. Use for 1-2 weeks daily.   Amoxicillin 875mg bid for 7 days, advised patient she did not have ear infection today and that another abx may not help symptoms and could cause SE. She is requesting amoxicillin as this helped her in the past.   Diflucan 150mg x1 for hx of yeast infection with abx.   If symptoms persist recommend ENT f/u, she will call  for appt as she is established.   Do not put anything in ears.   Trial mobic 15mg daily as needed for pain, avoid OTC nsaids such as ibuprofen, aleve, advil, naproxen while taking may take tylenol as needed. Encouraged rest, ice, elevation.   Increase fluid intake, get plenty of rest.   Patient agrees with plan of care and understands instructions. Call if worsening symptoms or any problems or concerns.

## 2020-04-22 NOTE — PATIENT INSTRUCTIONS
Cont claritin D in am, flonase daily in pm,   mucinex 2x day. Use for 1-2 weeks daily.   Amoxicillin 875mg bid for 7 days, advised patient she did not have ear infection today and that another abx may not help symptoms and could cause SE. She is requesting amoxicillin as this helped her in the past.   Diflucan 150mg x1 for hx of yeast infection with abx.   If symptoms persist recommend ENT f/u, she will call for appt as she is established.   Do not put anything in ears.   Trial mobic 15mg daily as needed for pain, avoid OTC nsaids such as ibuprofen, aleve, advil, naproxen while taking may take tylenol as needed. Encouraged rest, ice, elevation.   Increase fluid intake, get plenty of rest.   Patient agrees with plan of care and understands instructions. Call if worsening symptoms or any problems or concerns.

## 2020-06-03 ENCOUNTER — OFFICE VISIT (OUTPATIENT)
Dept: FAMILY MEDICINE CLINIC | Facility: CLINIC | Age: 40
End: 2020-06-03

## 2020-06-03 VITALS
HEART RATE: 76 BPM | WEIGHT: 184.1 LBS | DIASTOLIC BLOOD PRESSURE: 80 MMHG | TEMPERATURE: 98.4 F | BODY MASS INDEX: 27.9 KG/M2 | SYSTOLIC BLOOD PRESSURE: 108 MMHG | HEIGHT: 68 IN | OXYGEN SATURATION: 98 %

## 2020-06-03 DIAGNOSIS — Z11.3 SCREEN FOR STD (SEXUALLY TRANSMITTED DISEASE): Primary | ICD-10-CM

## 2020-06-03 DIAGNOSIS — N89.8 VAGINAL IRRITATION: ICD-10-CM

## 2020-06-03 PROCEDURE — 99213 OFFICE O/P EST LOW 20 MIN: CPT | Performed by: NURSE PRACTITIONER

## 2020-06-03 RX ORDER — CLOTRIMAZOLE 1 %
CREAM (GRAM) TOPICAL 2 TIMES DAILY
Qty: 24 G | Refills: 0 | Status: SHIPPED | OUTPATIENT
Start: 2020-06-03 | End: 2022-04-07

## 2020-06-03 RX ORDER — FLUCONAZOLE 150 MG/1
TABLET ORAL
Qty: 2 TABLET | Refills: 0 | Status: SHIPPED | OUTPATIENT
Start: 2020-06-03 | End: 2020-06-10 | Stop reason: SDUPTHER

## 2020-06-03 NOTE — PROGRESS NOTES
Subjective   Sonja Sierra is a 40 y.o. female.     History of Present Illness   Here today for STD screen, she c/o vaginal itching, irritation, she states symptoms started about 1 week ago, she is sexually active, usually wears condoms, she denies vaginal d/c. She tried yeast pill OTC but did not help, she does not have menses, she had hysterectomy, states she has external irritation, she denies vaginal, she denies urinary symptoms. She denies any known exposure.       The following portions of the patient's history were reviewed and updated as appropriate: allergies, current medications, past family history, past medical history, past social history, past surgical history and problem list.    Review of Systems   Constitutional: Negative for chills, diaphoresis and fever.   Respiratory: Negative for cough and shortness of breath.    Cardiovascular: Negative for chest pain.   Genitourinary: Negative for dysuria, frequency, pelvic pain, vaginal bleeding and vaginal discharge.   Musculoskeletal: Negative for arthralgias and myalgias.   Neurological: Negative for dizziness, light-headedness and headache.   All other systems reviewed and are negative.      Objective   Physical Exam   Constitutional: She is oriented to person, place, and time. She appears well-developed and well-nourished.   HENT:   Head: Normocephalic.   Eyes: Pupils are equal, round, and reactive to light.   Cardiovascular: Normal rate, regular rhythm and normal heart sounds.   Pulmonary/Chest: Effort normal and breath sounds normal.   Musculoskeletal: Normal range of motion.   Neurological: She is alert and oriented to person, place, and time.   Skin: Skin is warm and dry.   Psychiatric: She has a normal mood and affect. Her behavior is normal.   Nursing note and vitals reviewed.        Assessment/Plan   Sonja was seen today for std screen.    Diagnoses and all orders for this visit:    Screen for STD (sexually transmitted disease)  -      Chlamydia trachomatis, Neisseria gonorrhoeae, PCR - , Cervix; Future  -     HIV-1 / O / 2 Ag / Antibody 4th Generation; Future  -     RPR; Future  -     Urinalysis With Microscopic - Urine, Clean Catch; Future  -     Urinalysis With Microscopic - Urine, Clean Catch  -     RPR  -     HIV-1 / O / 2 Ag / Antibody 4th Generation  -     Chlamydia trachomatis, Neisseria gonorrhoeae, PCR - , Cervix    Vaginal irritation  -     Chlamydia trachomatis, Neisseria gonorrhoeae, PCR - , Cervix; Future  -     HIV-1 / O / 2 Ag / Antibody 4th Generation; Future  -     RPR; Future  -     Urinalysis With Microscopic - Urine, Clean Catch; Future  -     Urinalysis With Microscopic - Urine, Clean Catch  -     RPR  -     HIV-1 / O / 2 Ag / Antibody 4th Generation  -     Chlamydia trachomatis, Neisseria gonorrhoeae, PCR - , Cervix    Other orders  -     fluconazole (Diflucan) 150 MG tablet; Take 1 tablet today then repeat again in 3 days.  -     clotrimazole (LOTRIMIN) 1 % cream; Apply  topically to the appropriate area as directed 2 (Two) Times a Day. External vaginal area.    Lab unavailable in office today, she will go to [facility] for labs.   Diflucan 150mg x1 today then repeat again in 3 days.   Clotrimazole cream to external vaginal area bid as needed for irritation.  Encourage protection with intercourse every encounter.   If symptoms persist 5-7 days call office will consider wet prep if needed.   Increase fluid intake, get plenty of rest.   Patient agrees with plan of care and understands instructions. Call if worsening symptoms or any problems or concerns.

## 2020-06-03 NOTE — PATIENT INSTRUCTIONS
Lab unavailable in office today, [facility].   Diflucan 150mg x1 today then repeat again in 3 days.   Clotrimazole cream to external vaginal area bid as needed for irritation.  Encourage protection with intercourse every encounter.   If symptoms persist 5-7 days call office will consider wet prep if needed.   Increase fluid intake, get plenty of rest.   Patient agrees with plan of care and understands instructions. Call if worsening symptoms or any problems or concerns.

## 2020-06-10 ENCOUNTER — DOCUMENTATION (OUTPATIENT)
Dept: FAMILY MEDICINE CLINIC | Facility: CLINIC | Age: 40
End: 2020-06-10

## 2020-06-10 RX ORDER — FLUCONAZOLE 150 MG/1
150 TABLET ORAL ONCE
Qty: 1 TABLET | Refills: 0 | Status: SHIPPED | OUTPATIENT
Start: 2020-06-10 | End: 2020-06-10

## 2020-07-08 ENCOUNTER — TELEPHONE (OUTPATIENT)
Dept: FAMILY MEDICINE CLINIC | Facility: CLINIC | Age: 40
End: 2020-07-08

## 2020-07-08 ENCOUNTER — OFFICE VISIT (OUTPATIENT)
Dept: FAMILY MEDICINE CLINIC | Facility: CLINIC | Age: 40
End: 2020-07-08

## 2020-07-08 DIAGNOSIS — J01.00 ACUTE MAXILLARY SINUSITIS, RECURRENCE NOT SPECIFIED: Primary | ICD-10-CM

## 2020-07-08 DIAGNOSIS — J30.2 SEASONAL ALLERGIES: ICD-10-CM

## 2020-07-08 PROCEDURE — 99213 OFFICE O/P EST LOW 20 MIN: CPT | Performed by: NURSE PRACTITIONER

## 2020-07-08 RX ORDER — FLUCONAZOLE 150 MG/1
TABLET ORAL
Qty: 3 TABLET | Refills: 0 | Status: SHIPPED | OUTPATIENT
Start: 2020-07-08 | End: 2020-11-19 | Stop reason: SDUPTHER

## 2020-07-08 RX ORDER — AMOXICILLIN 875 MG/1
875 TABLET, COATED ORAL 2 TIMES DAILY
Qty: 14 TABLET | Refills: 0 | Status: SHIPPED | OUTPATIENT
Start: 2020-07-08 | End: 2020-07-15

## 2020-07-08 NOTE — PATIENT INSTRUCTIONS
Start amoxicillin 875mg bid for 7 days.   Cont flonase, claritin D daily.   She will call for allergy appt if needed.   Diflucan 150mg for hx of yeast infect with abx.   May try saline spray OTC as needed.   If symptoms persist 5-7 days call office   Increase fluid intake, get plenty of rest.   Patient agrees with plan of care and understands instructions. Call if worsening symptoms or any problems or concerns.

## 2020-07-08 NOTE — PROGRESS NOTES
Subjective   Sonja Sierra is a 40 y.o. female.     History of Present Illness   You have chosen to receive care through a telephone visit. Do you consent to use a telephone visit for your medical care today? Yes time spent during visit 10 minutes.   C/o sinusitis. She was seen in 4/2020 for AOM, given several abx improved with amoxicillin. Noticed congestion about 5 days ago, states HA worsening 2-3 days ago, she has ear pain today, using flonase, she denies fever, she is taking Claritin D daily. Denies sore throat, worsening sinus pressure today. Denies cough. She denies drainage. She has bilat ear pain, she does have some decreased hearing. Prev saw allergist. No testing.           The following portions of the patient's history were reviewed and updated as appropriate: allergies, current medications, past family history, past medical history, past social history, past surgical history and problem list.    Review of Systems   Constitutional: Negative for chills, diaphoresis and fever.   HENT: Positive for ear pain, postnasal drip, rhinorrhea and sinus pressure. Negative for sore throat.    Respiratory: Negative for cough and shortness of breath.    Cardiovascular: Negative for chest pain.   Musculoskeletal: Negative for arthralgias and myalgias.   Allergic/Immunologic: Positive for environmental allergies.   Neurological: Negative for dizziness, light-headedness and headache.   All other systems reviewed and are negative.      Objective   Physical Exam   Constitutional: She is oriented to person, place, and time.   Pulmonary/Chest: Effort normal.   Neurological: She is alert and oriented to person, place, and time.   physical exam limited d/t telephone visit.       Assessment/Plan   Sonja was seen today for sinusitis.    Diagnoses and all orders for this visit:    Acute maxillary sinusitis, recurrence not specified    Seasonal allergies    Other orders  -     amoxicillin (AMOXIL) 875 MG tablet; Take 1 tablet  by mouth 2 (Two) Times a Day for 7 days.  -     fluconazole (Diflucan) 150 MG tablet; Take 1 today then repeat again in 3 days.          Start amoxicillin 875mg bid for 7 days.   Cont flonase, claritin D daily.   She will call for allergy appt if needed.   Diflucan 150mg for hx of yeast infect with abx.   May try saline spray OTC as needed.   If symptoms persist 5-7 days call office   Increase fluid intake, get plenty of rest.   Patient agrees with plan of care and understands instructions. Call if worsening symptoms or any problems or concerns.

## 2020-07-08 NOTE — TELEPHONE ENCOUNTER
If ear pain recommend in office visit. If sinus pressure alone we can change to telemed video or phone visit if she prefers

## 2020-07-08 NOTE — TELEPHONE ENCOUNTER
PT CALLED IN COMPLAINING OF SINUS PRESSURE AND WANTED TO KNOW IF CHANELLE KWON WOULD JUST CALL HER SOMETHING IN INSTEAD OF HER HAVING TO COME IN THE OFFICE FOR THE APPOINTMENT THAT I SCHEDULED HER TODAY.     DENNISE NELSON CONFIRMED

## 2020-11-18 ENCOUNTER — TELEPHONE (OUTPATIENT)
Dept: FAMILY MEDICINE CLINIC | Facility: CLINIC | Age: 40
End: 2020-11-18

## 2020-11-18 NOTE — TELEPHONE ENCOUNTER
Caller: Sonja Sierra    Relationship: Self    Best call back number: [phone]  What medication are you requesting: ANY MEDICATION TO RELIEVE EAR PAIN     What are your current symptoms: EAR PAIN, PRESSURE       If a prescription is needed, what is your preferred pharmacy and phone number: DENNISE [address] - [address] [facility] AT [facility] - [phone] [facility] [phone]      Additional notes: WERE NO AVAILABLE OFFICE VISITS OR TELEHEALTH UNTIL BEGINNING OF December, PATIENT REQUESTING MEDICATION BE CALLED IN. PATIENT REQUESTING CALL BACK IF UNABLE TO CALL MEDICATION IN FOR HER.    PLEASE ADVISE.

## 2020-11-19 ENCOUNTER — OFFICE VISIT (OUTPATIENT)
Dept: FAMILY MEDICINE CLINIC | Facility: CLINIC | Age: 40
End: 2020-11-19

## 2020-11-19 VITALS
HEART RATE: 61 BPM | RESPIRATION RATE: 20 BRPM | BODY MASS INDEX: 28.64 KG/M2 | OXYGEN SATURATION: 100 % | TEMPERATURE: 97.3 F | DIASTOLIC BLOOD PRESSURE: 72 MMHG | SYSTOLIC BLOOD PRESSURE: 120 MMHG | HEIGHT: 68 IN | WEIGHT: 189 LBS

## 2020-11-19 DIAGNOSIS — H65.03 BILATERAL ACUTE SEROUS OTITIS MEDIA, RECURRENCE NOT SPECIFIED: ICD-10-CM

## 2020-11-19 DIAGNOSIS — J01.00 ACUTE MAXILLARY SINUSITIS, RECURRENCE NOT SPECIFIED: Primary | ICD-10-CM

## 2020-11-19 DIAGNOSIS — J30.9 ALLERGIC RHINITIS, UNSPECIFIED SEASONALITY, UNSPECIFIED TRIGGER: ICD-10-CM

## 2020-11-19 PROCEDURE — 99213 OFFICE O/P EST LOW 20 MIN: CPT | Performed by: NURSE PRACTITIONER

## 2020-11-19 RX ORDER — FLUTICASONE PROPIONATE 50 MCG
2 SPRAY, SUSPENSION (ML) NASAL DAILY
Qty: 1 BOTTLE | Refills: 12 | Status: SHIPPED | OUTPATIENT
Start: 2020-11-19 | End: 2022-01-11

## 2020-11-19 RX ORDER — FLUCONAZOLE 150 MG/1
TABLET ORAL
Qty: 2 TABLET | Refills: 0 | Status: SHIPPED | OUTPATIENT
Start: 2020-11-19 | End: 2020-12-03 | Stop reason: SDUPTHER

## 2020-11-19 RX ORDER — AMOXICILLIN 875 MG/1
875 TABLET, COATED ORAL 2 TIMES DAILY
Qty: 14 TABLET | Refills: 0 | Status: SHIPPED | OUTPATIENT
Start: 2020-11-19 | End: 2020-11-26

## 2020-11-19 RX ORDER — LORATADINE AND PSEUDOEPHEDRINE SULFATE 5; 120 MG/1; MG/1
1 TABLET, EXTENDED RELEASE ORAL DAILY PRN
Qty: 30 TABLET | Refills: 6 | Status: SHIPPED | OUTPATIENT
Start: 2020-11-19 | End: 2022-01-11

## 2020-11-19 RX ORDER — GUAIFENESIN 600 MG/1
1200 TABLET, EXTENDED RELEASE ORAL 2 TIMES DAILY
Qty: 60 TABLET | Refills: 1 | Status: SHIPPED | OUTPATIENT
Start: 2020-11-19 | End: 2022-04-07

## 2020-11-19 NOTE — PATIENT INSTRUCTIONS
Start amoxicillin 875mg bid for 7 days.   Refilled flonase and claritin D cont daily as needed.   mucinex bid as needed.  If symptoms persist 5-7 days call office.  Diflucan for hx of yeast infection with abx.   Increase fluid intake, get plenty of rest.   Patient agrees with plan of care and understands instructions. Call if worsening symptoms or any problems or concerns.

## 2020-11-19 NOTE — PROGRESS NOTES
Subjective   Sonja Sierra is a 40 y.o. female.     History of Present Illness   C/o sinus pressure, ear pain, sore throat, she states symptoms started about 5 days ago, L>R she feels ear pressure, denies cough or fever. She is taking claritin D and flonase daily. Needs refills today. She did not try anything OTC.       The following portions of the patient's history were reviewed and updated as appropriate: allergies, current medications, past family history, past medical history, past social history, past surgical history and problem list.    Review of Systems   Constitutional: Negative for chills, diaphoresis and fever.   HENT: Positive for ear pain and sinus pressure. Negative for postnasal drip and sore throat.    Respiratory: Negative for cough and shortness of breath.    Cardiovascular: Negative for chest pain and leg swelling.   Musculoskeletal: Negative for arthralgias and myalgias.   Neurological: Negative for dizziness, light-headedness and headache.   All other systems reviewed and are negative.      Objective   Physical Exam  Vitals signs and nursing note reviewed.   Constitutional:       Appearance: She is well-developed.   HENT:      Head: Normocephalic.      Right Ear: A middle ear effusion is present.      Left Ear: A middle ear effusion is present.      Nose:      Right Sinus: Maxillary sinus tenderness present.      Left Sinus: Maxillary sinus tenderness present.      Mouth/Throat:      Pharynx: Oropharynx is clear.   Eyes:      Pupils: Pupils are equal, round, and reactive to light.   Cardiovascular:      Rate and Rhythm: Normal rate and regular rhythm.      Heart sounds: Normal heart sounds.   Pulmonary:      Effort: Pulmonary effort is normal.      Breath sounds: Normal breath sounds.   Skin:     General: Skin is warm and dry.   Neurological:      Mental Status: She is alert and oriented to person, place, and time.   Psychiatric:         Behavior: Behavior normal.         Judgment: Judgment  normal.           Assessment/Plan   Diagnoses and all orders for this visit:    1. Acute maxillary sinusitis, recurrence not specified (Primary)    2. Allergic rhinitis, unspecified seasonality, unspecified trigger    3. Bilateral acute serous otitis media, recurrence not specified    Other orders  -     loratadine-pseudoephedrine (Claritin-D 12 Hour) 5-120 MG per 12 hr tablet; Take 1 tablet by mouth Daily As Needed for Allergies.  Dispense: 30 tablet; Refill: 6  -     fluticasone (FLONASE) 50 MCG/ACT nasal spray; 2 sprays into the nostril(s) as directed by provider Daily.  Dispense: 1 bottle; Refill: 12  -     fluconazole (Diflucan) 150 MG tablet; Take 1 today then repeat again in 3 days.  Dispense: 2 tablet; Refill: 0  -     amoxicillin (AMOXIL) 875 MG tablet; Take 1 tablet by mouth 2 (Two) Times a Day for 7 days.  Dispense: 14 tablet; Refill: 0  -     guaiFENesin (Mucinex) 600 MG 12 hr tablet; Take 2 tablets by mouth 2 (Two) Times a Day.  Dispense: 60 tablet; Refill: 1        Start amoxicillin 875mg bid for 7 days.   Refilled flonase and claritin D cont daily as needed.   mucinex bid as needed.  If symptoms persist 5-7 days call office.  Diflucan for hx of yeast infection with abx.   Increase fluid intake, get plenty of rest.   Patient agrees with plan of care and understands instructions. Call if worsening symptoms or any problems or concerns.

## 2020-12-03 ENCOUNTER — TELEPHONE (OUTPATIENT)
Dept: FAMILY MEDICINE CLINIC | Facility: CLINIC | Age: 40
End: 2020-12-03

## 2020-12-03 RX ORDER — FLUCONAZOLE 150 MG/1
TABLET ORAL
Qty: 2 TABLET | Refills: 0 | Status: SHIPPED | OUTPATIENT
Start: 2020-12-03 | End: 2020-12-03 | Stop reason: SDUPTHER

## 2020-12-03 RX ORDER — FLUCONAZOLE 150 MG/1
TABLET ORAL
Qty: 2 TABLET | Refills: 0 | Status: SHIPPED | OUTPATIENT
Start: 2020-12-03 | End: 2021-04-08 | Stop reason: SDUPTHER

## 2020-12-03 NOTE — TELEPHONE ENCOUNTER
Sent in diflucan refill, recommend ENT consult for ear pain, looks like she saw [facility] ent in 2019, does she still have ent or need new referral?

## 2020-12-03 NOTE — TELEPHONE ENCOUNTER
"PATIENT CALLED AND STATED THAT SHE IS STILL HAVING IRRITATION FROM THE YEAST INFECTION, AND REQUESTING THAT CHANELLE KWON SEND IN SOME MORE DIFLUCAN TO HER PHARMACY.  THE PATIENT ALSO STATED THAT THE SINUS PRESSURE IS BETTER BUT SHE IS STILL HAVING BILATERAL EAR PROBLEMS. THE EARACHE IS BETTER, BUT STILL FEELS LIKE HER EARS ARE \"FOGGY\" AND EFFECTS HEARING.    PHARMACY CONFIRMED:   DENNISE [address]OS[address] - [phone]  - [phone] [place]     PATIENT CALLBACK: [phone]  PATIENT WILL BE AT WORK, IF CALLING, PLEASE LEAVE A MESSAGE.  "

## 2021-01-08 ENCOUNTER — TELEPHONE (OUTPATIENT)
Dept: FAMILY MEDICINE CLINIC | Facility: CLINIC | Age: 41
End: 2021-01-08

## 2021-01-08 DIAGNOSIS — H92.09 OTALGIA, UNSPECIFIED LATERALITY: Primary | ICD-10-CM

## 2021-01-08 NOTE — TELEPHONE ENCOUNTER
Caller: Sonja Sierra    Relationship: Self    Best call back number: [phone]    What orders are you requesting (i.e. lab or imaging): A NEW REFERRAL TO AN ENT    In what timeframe would the patient need to come in: AS SOON AS POSSIBLE    Where will you receive your lab/imaging services:PATIENT REQUESTING TO SEE ANOTHER ENT.    Additional notes:  PATIENT STATED THAT SHE HAS BEEN TO THE FIRST ENT THAT WAS REFERRED BUT HE STATES THAT HE ISN'T FINDING ANYTHING.  PATIENT HAS BEEN ON DIFFERENT MEDICATIONS SINCE November.  PATIENT STATED THAT HER EARS STILL FEEL CLOGGED, AND INTERMITTEN EAR ACHES.  SINUS PRESSURE, HEADACHES, AND CONGESTION ARE ON GOING.    PATIENT ASKED IF SHE NEEDS ANOTHER APPOINTMENT WITH CHANELLE PRIOR TO RECEIVING THE REFERRAL.

## 2021-04-08 ENCOUNTER — OFFICE VISIT (OUTPATIENT)
Dept: FAMILY MEDICINE CLINIC | Facility: CLINIC | Age: 41
End: 2021-04-08

## 2021-04-08 DIAGNOSIS — N89.8 VAGINAL IRRITATION: Primary | ICD-10-CM

## 2021-04-08 PROCEDURE — 99442 PR PHYS/QHP TELEPHONE EVALUATION 11-20 MIN: CPT | Performed by: NURSE PRACTITIONER

## 2021-04-08 RX ORDER — METRONIDAZOLE 500 MG/1
500 TABLET ORAL 2 TIMES DAILY
Qty: 14 TABLET | Refills: 0 | Status: SHIPPED | OUTPATIENT
Start: 2021-04-08 | End: 2021-05-11

## 2021-04-08 RX ORDER — FLUCONAZOLE 150 MG/1
TABLET ORAL
Qty: 2 TABLET | Refills: 0 | Status: SHIPPED | OUTPATIENT
Start: 2021-04-08 | End: 2021-04-26 | Stop reason: SDUPTHER

## 2021-04-08 NOTE — PROGRESS NOTES
"Chief Complaint  No chief complaint on file.    Subjective          Sonja Sierra presents to [facility]  History of Present Illness  C/o vag itching with pain with vag odor \"fishy\" x 2 weeks used OTC Azo yeast pills no help, notes using new dove soap with shea butter, No vag dc or urin sx, no douching, no new sexual partners, recent intercourse no pain, no longer with menses s/p partial hyst, Last STD screen 06/20 normal, not wearing cotton underwear, last tx yeast infection 06/20 responded to diflucan    Objective   Vital Signs:   There were no vitals taken for this visit.    Physical Exam  Vitals reviewed.   Constitutional:       Appearance: She is well-developed.   Pulmonary:      Effort: Pulmonary effort is normal.      Breath sounds: Normal breath sounds.   Neurological:      Mental Status: She is alert and oriented to person, place, and time.   Psychiatric:         Mood and Affect: Mood normal.         Behavior: Behavior normal.         Thought Content: Thought content normal.         Judgment: Judgment normal.        Result Review :{Labs  Result Review  Imaging  Med Tab  Media :23}                 Assessment and Plan    Diagnoses and all orders for this visit:    1. Vaginal irritation (Primary)    Other orders  -     metroNIDAZOLE (Flagyl) 500 MG tablet; Take 1 tablet by mouth 2 (Two) Times a Day.  Dispense: 14 tablet; Refill: 0  -     fluconazole (Diflucan) 150 MG tablet; Take 1 today then repeat again in 3 days.  Dispense: 2 tablet; Refill: 0      I spent 11 minutes caring for Sonja on this date of service. This time includes time spent by me in the following activities:preparing for the visit, reviewing tests, obtaining and/or reviewing a separately obtained history, performing a medically appropriate examination and/or evaluation , counseling and educating the patient/family/caregiver, ordering medications, tests, or procedures and documenting information in the " medical record  Follow Up   No follow-ups on file.  Patient was given instructions and counseling regarding her condition or for health maintenance advice. Please see specific information pulled into the AVS if appropriate.     You have chosen to receive care through a telephone visit. Do you consent to use a telephone visit for your medical care today? Yes spent 11 min on phone with patient, erx flagyl 500 mg BID x 1 wk, no alcohol, erx diflucan 150 mg 1 PO today repeat in 3 days, switch to cotton underwear, no douching, increase H20 and monitor, if sx persist or worsen call or RTC

## 2021-04-08 NOTE — PATIENT INSTRUCTIONS
You have chosen to receive care through a telephone visit. Do you consent to use a telephone visit for your medical care today? Yes spent 11 min on phone with patient, erx flagyl 500 mg BID x 1 wk, no alcohol, erx diflucan 150 mg 1 PO today repeat in 3 days, switch to cotton underwear, no douching, increase H20 and monitor, if sx persist or worsen call or RTC

## 2021-04-26 ENCOUNTER — TELEPHONE (OUTPATIENT)
Dept: FAMILY MEDICINE CLINIC | Facility: CLINIC | Age: 41
End: 2021-04-26

## 2021-04-26 RX ORDER — FLUCONAZOLE 150 MG/1
TABLET ORAL
Qty: 2 TABLET | Refills: 0 | Status: SHIPPED | OUTPATIENT
Start: 2021-04-26 | End: 2021-05-11 | Stop reason: SDUPTHER

## 2021-04-26 NOTE — TELEPHONE ENCOUNTER
PATIENT STATES: THAT SHE WOULD LIKE A CALL BACK. SHE SAID THE SMELL CAME BACK BUT THERE IS NO DISCHARGE PLEASE ADVISE      PATIENT CAN BE REACHED ON: [phone]    [id number] [place], [place] - [address] [place] [address] AT [facility] - [phone]  - [phone]   [phone]

## 2021-04-26 NOTE — TELEPHONE ENCOUNTER
I sent in more diflucan and if doesn't resolve make FU apt to recheck    Need more information. Does she have itching? Any new sexual partners? Any new soaps?

## 2021-05-05 RX ORDER — MELOXICAM 15 MG/1
TABLET ORAL
Qty: 30 TABLET | Refills: 2 | Status: SHIPPED | OUTPATIENT
Start: 2021-05-05 | End: 2022-02-11 | Stop reason: SDUPTHER

## 2021-05-11 ENCOUNTER — OFFICE VISIT (OUTPATIENT)
Dept: FAMILY MEDICINE CLINIC | Facility: CLINIC | Age: 41
End: 2021-05-11

## 2021-05-11 VITALS
TEMPERATURE: 97.7 F | HEART RATE: 80 BPM | HEIGHT: 68 IN | RESPIRATION RATE: 18 BRPM | DIASTOLIC BLOOD PRESSURE: 70 MMHG | SYSTOLIC BLOOD PRESSURE: 120 MMHG | BODY MASS INDEX: 29.55 KG/M2 | OXYGEN SATURATION: 98 % | WEIGHT: 195 LBS

## 2021-05-11 DIAGNOSIS — N89.8 VAGINAL ODOR: ICD-10-CM

## 2021-05-11 DIAGNOSIS — R79.89 LOW VITAMIN D LEVEL: Primary | ICD-10-CM

## 2021-05-11 DIAGNOSIS — D50.9 IRON DEFICIENCY ANEMIA, UNSPECIFIED IRON DEFICIENCY ANEMIA TYPE: ICD-10-CM

## 2021-05-11 DIAGNOSIS — N76.0 BV (BACTERIAL VAGINOSIS): ICD-10-CM

## 2021-05-11 DIAGNOSIS — B96.89 BV (BACTERIAL VAGINOSIS): ICD-10-CM

## 2021-05-11 LAB
25(OH)D3 SERPL-MCNC: 21.4 NG/ML (ref 30–100)
ALBUMIN SERPL-MCNC: 4.1 G/DL (ref 3.5–5.2)
ALBUMIN/GLOB SERPL: 1.5 G/DL
ALP SERPL-CCNC: 84 U/L (ref 39–117)
ALT SERPL W P-5'-P-CCNC: 12 U/L (ref 1–33)
ANION GAP SERPL CALCULATED.3IONS-SCNC: 9.7 MMOL/L (ref 5–15)
AST SERPL-CCNC: 14 U/L (ref 1–32)
BILIRUB SERPL-MCNC: <0.2 MG/DL (ref 0–1.2)
BUN SERPL-MCNC: 12 MG/DL (ref 6–20)
BUN/CREAT SERPL: 22.2 (ref 7–25)
CALCIUM SPEC-SCNC: 9.1 MG/DL (ref 8.6–10.5)
CHLORIDE SERPL-SCNC: 105 MMOL/L (ref 98–107)
CLUE CELLS SPEC QL WET PREP: NORMAL
CO2 SERPL-SCNC: 25.3 MMOL/L (ref 22–29)
CREAT SERPL-MCNC: 0.54 MG/DL (ref 0.57–1)
ERYTHROCYTE [DISTWIDTH] IN BLOOD BY AUTOMATED COUNT: 12.9 % (ref 12.3–15.4)
FERRITIN SERPL-MCNC: 61.4 NG/ML (ref 13–150)
GFR SERPL CREATININE-BSD FRML MDRD: 149 ML/MIN/1.73
GLOBULIN UR ELPH-MCNC: 2.8 GM/DL
GLUCOSE SERPL-MCNC: 72 MG/DL (ref 65–99)
HCT VFR BLD AUTO: 36.3 % (ref 34–46.6)
HGB BLD-MCNC: 11.3 G/DL (ref 12–15.9)
HYDATID CYST SPEC WET PREP: NORMAL
IRON 24H UR-MRATE: 58 MCG/DL (ref 37–145)
IRON SATN MFR SERPL: 18 % (ref 20–50)
LYMPHOCYTES # BLD AUTO: 1.9 10*3/MM3 (ref 0.7–3.1)
LYMPHOCYTES NFR BLD AUTO: 32.5 % (ref 19.6–45.3)
MCH RBC QN AUTO: 28.9 PG (ref 26.6–33)
MCHC RBC AUTO-ENTMCNC: 31.3 G/DL (ref 31.5–35.7)
MCV RBC AUTO: 92.5 FL (ref 79–97)
MONOCYTES # BLD AUTO: 0.4 10*3/MM3 (ref 0.1–0.9)
MONOCYTES NFR BLD AUTO: 7.2 % (ref 5–12)
NEUTROPHILS NFR BLD AUTO: 3.6 10*3/MM3 (ref 1.7–7)
NEUTROPHILS NFR BLD AUTO: 60.3 % (ref 42.7–76)
PLATELET # BLD AUTO: 206 10*3/MM3 (ref 140–450)
PMV BLD AUTO: 6.8 FL (ref 6–12)
POTASSIUM SERPL-SCNC: 3.9 MMOL/L (ref 3.5–5.2)
PROT SERPL-MCNC: 6.9 G/DL (ref 6–8.5)
RBC # BLD AUTO: 3.92 10*6/MM3 (ref 3.77–5.28)
SODIUM SERPL-SCNC: 140 MMOL/L (ref 136–145)
T VAGINALIS SPEC QL WET PREP: NORMAL
TIBC SERPL-MCNC: 325 MCG/DL (ref 298–536)
TRANSFERRIN SERPL-MCNC: 218 MG/DL (ref 200–360)
TSH SERPL DL<=0.05 MIU/L-ACNC: 1.21 UIU/ML (ref 0.27–4.2)
WBC # BLD AUTO: 5.9 10*3/MM3 (ref 3.4–10.8)
WBC SPEC QL WET PREP: NORMAL
YEAST GENITAL QL WET PREP: NORMAL

## 2021-05-11 PROCEDURE — 80053 COMPREHEN METABOLIC PANEL: CPT | Performed by: NURSE PRACTITIONER

## 2021-05-11 PROCEDURE — 99213 OFFICE O/P EST LOW 20 MIN: CPT | Performed by: NURSE PRACTITIONER

## 2021-05-11 PROCEDURE — 82728 ASSAY OF FERRITIN: CPT | Performed by: NURSE PRACTITIONER

## 2021-05-11 PROCEDURE — 36415 COLL VENOUS BLD VENIPUNCTURE: CPT | Performed by: NURSE PRACTITIONER

## 2021-05-11 PROCEDURE — 84443 ASSAY THYROID STIM HORMONE: CPT | Performed by: NURSE PRACTITIONER

## 2021-05-11 PROCEDURE — 84466 ASSAY OF TRANSFERRIN: CPT | Performed by: NURSE PRACTITIONER

## 2021-05-11 PROCEDURE — 82306 VITAMIN D 25 HYDROXY: CPT | Performed by: NURSE PRACTITIONER

## 2021-05-11 PROCEDURE — 85025 COMPLETE CBC W/AUTO DIFF WBC: CPT | Performed by: NURSE PRACTITIONER

## 2021-05-11 PROCEDURE — 87210 SMEAR WET MOUNT SALINE/INK: CPT | Performed by: NURSE PRACTITIONER

## 2021-05-11 PROCEDURE — 83540 ASSAY OF IRON: CPT | Performed by: NURSE PRACTITIONER

## 2021-05-11 RX ORDER — CLINDAMYCIN HYDROCHLORIDE 300 MG/1
300 CAPSULE ORAL 3 TIMES DAILY
Qty: 30 CAPSULE | Refills: 0 | Status: SHIPPED | OUTPATIENT
Start: 2021-05-11 | End: 2022-04-07

## 2021-05-11 RX ORDER — FLUCONAZOLE 150 MG/1
TABLET ORAL
Qty: 2 TABLET | Refills: 0 | Status: SHIPPED | OUTPATIENT
Start: 2021-05-11 | End: 2021-05-24 | Stop reason: SDUPTHER

## 2021-05-11 NOTE — PROGRESS NOTES
"Chief Complaint  Labs Only (would like iron level check and vit d) and vaginal odor (no itching or burning/ took Rx of diflucan and flagyl)    Subjective          Sonja Sierra presents to [facility]  History of Present Illness  C/o vaginal odor, she denies itching or burning, she had telephone visit with Dagmar Palacios on 4/8/2021 for vaginal irritation and given flagyl and diflucan for symptoms, she states medications improved odor but then returned. Now has itching. She does see GYN, thinks last pap about 2 years ago, she did have hysterectomy.   Also with MEÑO and vit d def, she would like to recheck labs today. Last labs 2019. She is not currently taking paul or vit d.       Objective   Vital Signs:   /70 (BP Location: Left arm, Patient Position: Sitting)   Pulse 80   Temp 97.7 °F (36.5 °C) (Infrared)   Resp 18   Ht 172.7 cm (68\")   Wt 88.5 kg (195 lb)   SpO2 98%   BMI 29.65 kg/m²     Physical Exam  Vitals and nursing note reviewed.   Constitutional:       Appearance: She is well-developed.   HENT:      Head: Normocephalic.   Eyes:      Pupils: Pupils are equal, round, and reactive to light.   Cardiovascular:      Rate and Rhythm: Normal rate and regular rhythm.      Heart sounds: Normal heart sounds.   Pulmonary:      Effort: Pulmonary effort is normal.      Breath sounds: Normal breath sounds.   Genitourinary:     Exam position: Supine.      Labia:         Right: No rash or tenderness.         Left: No rash or tenderness.       Vagina: No vaginal discharge or tenderness.      Comments: Positive whiff test   Skin:     General: Skin is warm and dry.   Neurological:      Mental Status: She is alert and oriented to person, place, and time.   Psychiatric:         Behavior: Behavior normal.         Judgment: Judgment normal.        Result Review :                 Assessment and Plan    Diagnoses and all orders for this visit:    1. Low vitamin D level (Primary)  -     " Comprehensive Metabolic Panel  -     CBC & Differential  -     TSH  -     Vitamin D 25 Hydroxy  -     Ferritin  -     Iron Profile    2. Iron deficiency anemia, unspecified iron deficiency anemia type  -     Comprehensive Metabolic Panel  -     CBC & Differential  -     TSH  -     Vitamin D 25 Hydroxy  -     Ferritin  -     Iron Profile    3. Vaginal odor  -     Wet Prep, Genital - Swab, Vagina  -     Comprehensive Metabolic Panel  -     CBC & Differential  -     TSH  -     Vitamin D 25 Hydroxy  -     Ferritin  -     Iron Profile    4. BV (bacterial vaginosis)    Other orders  -     clindamycin (Cleocin) 300 MG capsule; Take 1 capsule by mouth 3 (Three) Times a Day. x10d  Dispense: 30 capsule; Refill: 0  -     fluconazole (Diflucan) 150 MG tablet; Take 1 today then repeat again in 3 days.  Dispense: 2 tablet; Refill: 0        Follow Up   Return if symptoms worsen or fail to improve.  Patient was given instructions and counseling regarding her condition or for health maintenance advice. Please see specific information pulled into the AVS if appropriate.     Wet prep today, BV  Start clindamycin 300mg tid for 10 days.   Diflucan 150mg for itching.   Labs today will call with results.   Drink plenty of H2O, wipe front to back after urination.   Avoid bladder irritants- coffee, caffeine, carbonation.  Patient agrees with plan of care and understands instructions. Call if worsening symptoms or any problems or concerns.

## 2021-05-11 NOTE — PATIENT INSTRUCTIONS
Wet prep today, BV  Start clindamycin 300mg tid for 10 days.   Diflucan 150mg for itching.   Labs today will call with results.   Drink plenty of H2O, wipe front to back after urination.   Avoid bladder irritants- coffee, caffeine, carbonation.  Patient agrees with plan of care and understands instructions. Call if worsening symptoms or any problems or concerns.

## 2021-05-13 DIAGNOSIS — E55.9 VITAMIN D DEFICIENCY: Primary | ICD-10-CM

## 2021-05-13 RX ORDER — ERGOCALCIFEROL 1.25 MG/1
50000 CAPSULE ORAL WEEKLY
Qty: 12 CAPSULE | Refills: 0 | Status: SHIPPED | OUTPATIENT
Start: 2021-05-13 | End: 2021-07-29

## 2021-05-24 RX ORDER — FLUCONAZOLE 150 MG/1
TABLET ORAL
Qty: 2 TABLET | Refills: 0 | Status: SHIPPED | OUTPATIENT
Start: 2021-05-24 | End: 2022-04-07

## 2021-05-24 NOTE — TELEPHONE ENCOUNTER
Caller: Sonja Sierra    Relationship: Self    Best call back number: [phone]    Medication needed:   Requested Prescriptions     Pending Prescriptions Disp Refills   • fluconazole (Diflucan) 150 MG tablet 2 tablet 0     Sig: Take 1 today then repeat again in 3 days.       When do you need the refill by: 05/24    What additional details did the patient provide when requesting the medication: PATIENT FINISHED THE MEDICATION BUT IT HAS CAUSED A YEAST.    Does the patient have less than a 3 day supply:  [x] Yes  [] No    What is the patient's preferred pharmacy: [facility] [address] - [phone] [facility] [phone] FX

## 2021-07-29 RX ORDER — ERGOCALCIFEROL 1.25 MG/1
50000 CAPSULE ORAL WEEKLY
Qty: 12 CAPSULE | Refills: 0 | Status: SHIPPED | OUTPATIENT
Start: 2021-07-29 | End: 2021-08-31 | Stop reason: SDUPTHER

## 2021-08-30 ENCOUNTER — LAB (OUTPATIENT)
Dept: FAMILY MEDICINE CLINIC | Facility: CLINIC | Age: 41
End: 2021-08-30

## 2021-08-30 DIAGNOSIS — E55.9 VITAMIN D DEFICIENCY: ICD-10-CM

## 2021-08-30 LAB — 25(OH)D3 SERPL-MCNC: 28.8 NG/ML (ref 30–100)

## 2021-08-30 PROCEDURE — 36415 COLL VENOUS BLD VENIPUNCTURE: CPT | Performed by: NURSE PRACTITIONER

## 2021-08-30 PROCEDURE — 82306 VITAMIN D 25 HYDROXY: CPT | Performed by: NURSE PRACTITIONER

## 2021-08-31 RX ORDER — ERGOCALCIFEROL 1.25 MG/1
50000 CAPSULE ORAL WEEKLY
Qty: 12 CAPSULE | Refills: 0 | Status: SHIPPED | OUTPATIENT
Start: 2021-08-31 | End: 2021-08-31 | Stop reason: SDUPTHER

## 2021-08-31 RX ORDER — ERGOCALCIFEROL 1.25 MG/1
50000 CAPSULE ORAL WEEKLY
Qty: 12 CAPSULE | Refills: 0 | Status: SHIPPED | OUTPATIENT
Start: 2021-08-31 | End: 2022-02-22

## 2022-01-11 RX ORDER — FLUTICASONE PROPIONATE 50 MCG
SPRAY, SUSPENSION (ML) NASAL
Qty: 16 ML | Refills: 3 | Status: SHIPPED | OUTPATIENT
Start: 2022-01-11 | End: 2022-11-11 | Stop reason: SDUPTHER

## 2022-01-11 RX ORDER — LORATADINE AND PSEUDOEPHEDRINE SULFATE 5; 120 MG/1; MG/1
TABLET, EXTENDED RELEASE ORAL
Qty: 30 TABLET | Refills: 6 | Status: SHIPPED | OUTPATIENT
Start: 2022-01-11 | End: 2022-11-11 | Stop reason: SDUPTHER

## 2022-01-25 ENCOUNTER — TELEPHONE (OUTPATIENT)
Dept: FAMILY MEDICINE CLINIC | Facility: CLINIC | Age: 42
End: 2022-01-25

## 2022-01-25 NOTE — TELEPHONE ENCOUNTER
Caller: Sojna Sierra    Relationship: Self    Best call back number: [phone]    What specialty or service is being requested: DERMATOLOGIST    Any additional details: HAIR IS SHEDDING.

## 2022-02-11 RX ORDER — MELOXICAM 15 MG/1
15 TABLET ORAL DAILY
Qty: 30 TABLET | Refills: 2 | Status: SHIPPED | OUTPATIENT
Start: 2022-02-11 | End: 2022-04-07 | Stop reason: SDUPTHER

## 2022-02-22 RX ORDER — ERGOCALCIFEROL 1.25 MG/1
50000 CAPSULE ORAL WEEKLY
Qty: 12 CAPSULE | Refills: 0 | Status: SHIPPED | OUTPATIENT
Start: 2022-02-22 | End: 2022-05-13

## 2022-04-07 ENCOUNTER — OFFICE VISIT (OUTPATIENT)
Dept: FAMILY MEDICINE CLINIC | Facility: CLINIC | Age: 42
End: 2022-04-07

## 2022-04-07 VITALS
BODY MASS INDEX: 29.7 KG/M2 | DIASTOLIC BLOOD PRESSURE: 70 MMHG | HEART RATE: 67 BPM | RESPIRATION RATE: 20 BRPM | WEIGHT: 196 LBS | OXYGEN SATURATION: 99 % | SYSTOLIC BLOOD PRESSURE: 122 MMHG | TEMPERATURE: 97.3 F | HEIGHT: 68 IN

## 2022-04-07 DIAGNOSIS — M25.572 ACUTE LEFT ANKLE PAIN: ICD-10-CM

## 2022-04-07 DIAGNOSIS — M25.562 CHRONIC PAIN OF BOTH KNEES: Primary | ICD-10-CM

## 2022-04-07 DIAGNOSIS — G89.29 CHRONIC PAIN OF BOTH KNEES: Primary | ICD-10-CM

## 2022-04-07 DIAGNOSIS — M79.672 PAIN OF LEFT HEEL: ICD-10-CM

## 2022-04-07 DIAGNOSIS — M25.561 CHRONIC PAIN OF BOTH KNEES: Primary | ICD-10-CM

## 2022-04-07 PROCEDURE — 73650 X-RAY EXAM OF HEEL: CPT | Performed by: NURSE PRACTITIONER

## 2022-04-07 PROCEDURE — 99214 OFFICE O/P EST MOD 30 MIN: CPT | Performed by: NURSE PRACTITIONER

## 2022-04-07 PROCEDURE — 73610 X-RAY EXAM OF ANKLE: CPT | Performed by: NURSE PRACTITIONER

## 2022-04-07 PROCEDURE — 73560 X-RAY EXAM OF KNEE 1 OR 2: CPT | Performed by: NURSE PRACTITIONER

## 2022-04-07 RX ORDER — MELOXICAM 15 MG/1
15 TABLET ORAL DAILY
Qty: 90 TABLET | Refills: 2 | Status: SHIPPED | OUTPATIENT
Start: 2022-04-07 | End: 2022-11-11 | Stop reason: SDUPTHER

## 2022-04-07 RX ORDER — TRIAMTERENE AND HYDROCHLOROTHIAZIDE 37.5; 25 MG/1; MG/1
1 CAPSULE ORAL DAILY
Qty: 90 CAPSULE | Refills: 0 | Status: SHIPPED | OUTPATIENT
Start: 2022-04-07 | End: 2022-11-11 | Stop reason: SDUPTHER

## 2022-04-07 NOTE — PATIENT INSTRUCTIONS
Encouraged rest, ice, elevation, stretches given, good shoes and inserts, avoid walking barefoot.   Xrays today will call with results.   She will cont mobic as needed, avoid aleve or ibuprofen while taking, may take tyelnol as needed for pain,  She will return for labs, unavailable today,   Refilled dyazide as needed for swelling,   If symptoms persist call office, can make f/u with ortho if needed.   Patient agrees with plan of care and understands instructions. Call if worsening symptoms or any problems or concerns.

## 2022-04-07 NOTE — PROGRESS NOTES
"Chief Complaint  Ankle Pain (Left swollen and painful/has shooting pain when walking) and Joint Swelling (And aching both knees/hard to keep bent for long periods of time)    Subjective          Sonja Sierra presents to [facility]  History of Present Illness  C/o left ankel pain, swelling, states pain with ambulation, states shooting pain with ambulation, started about 2 weeks ago. She states prev had pain, comes and goes. She denies any injury, she works in a warehouse, walking on concrete, she tried mobic at home but did not help. States she fell in January on ice and had ankle swelling but comes and goes since then.   Also c/o joint swelling, aches, states bilat knee pain, sees ortho. She does not get knee injection, she states pain comes and goes, states usually mobic does help knee pain. Sees [facility].  Prev taking dyazide for knee swelling as needed, denies LE edema.       Objective   Vital Signs:   /70 (BP Location: Left arm, Patient Position: Sitting)   Pulse 67   Temp 97.3 °F (36.3 °C) (Infrared)   Resp 20   Ht 172.7 cm (68\")   Wt 88.9 kg (196 lb)   SpO2 99%   BMI 29.80 kg/m²            Physical Exam  Vitals and nursing note reviewed.   Constitutional:       Appearance: She is well-developed.   HENT:      Head: Normocephalic.   Eyes:      Pupils: Pupils are equal, round, and reactive to light.   Cardiovascular:      Rate and Rhythm: Normal rate and regular rhythm.      Heart sounds: Normal heart sounds.   Pulmonary:      Effort: Pulmonary effort is normal.      Breath sounds: Normal breath sounds.   Musculoskeletal:      Right knee: Swelling present. No effusion or erythema. Normal range of motion.      Left knee: Swelling present. No effusion or erythema. Normal range of motion.      Right ankle:      Right Achilles Tendon: Normal.      Left ankle: Swelling present. Tenderness present over the lateral malleolus. Normal range of motion.      Left Achilles " Tendon: Normal.      Right foot: Normal.      Left foot: Normal range of motion. Tenderness present.   Skin:     General: Skin is warm and dry.   Neurological:      Mental Status: She is alert and oriented to person, place, and time.   Psychiatric:         Behavior: Behavior normal.         Judgment: Judgment normal.      bilat knees, left heel and left ankle xray today for pain, no comparison shows NAD, awaiting radiology over read.       Result Review :                 Assessment and Plan    Diagnoses and all orders for this visit:    1. Chronic pain of both knees (Primary)  -     XR Knee 1 or 2 View Bilateral (In Office)  -     XR Calcaneus 2+ View Left (In Office)  -     XR Ankle 3+ View Left (In Office)  -     Comprehensive Metabolic Panel; Future  -     CBC & Differential; Future    2. Acute left ankle pain  -     XR Knee 1 or 2 View Bilateral (In Office)  -     XR Calcaneus 2+ View Left (In Office)  -     XR Ankle 3+ View Left (In Office)  -     Comprehensive Metabolic Panel; Future  -     CBC & Differential; Future    3. Pain of left heel  -     XR Knee 1 or 2 View Bilateral (In Office)  -     XR Calcaneus 2+ View Left (In Office)  -     XR Ankle 3+ View Left (In Office)  -     Comprehensive Metabolic Panel; Future  -     CBC & Differential; Future    Other orders  -     meloxicam (MOBIC) 15 MG tablet; Take 1 tablet by mouth Daily.  Dispense: 90 tablet; Refill: 2  -     triamterene-hydrochlorothiazide (DYAZIDE) 37.5-25 MG per capsule; Take 1 capsule by mouth Daily.  Dispense: 90 capsule; Refill: 0        Follow Up   Return if symptoms worsen or fail to improve, for Annual physical.  Patient was given instructions and counseling regarding her condition or for health maintenance advice. Please see specific information pulled into the AVS if appropriate.     Encouraged rest, ice, elevation, stretches given, ankle sleeve, good shoes and inserts, avoid walking barefoot.   Xrays today will call with results.   She  will cont mobic as needed, avoid aleve or ibuprofen while taking, may take tyelnol as needed for pain,  She will return for labs, unavailable today,   Refilled dyazide as needed for swelling,   If symptoms persist call office, can make f/u with ortho if needed.   Patient agrees with plan of care and understands instructions. Call if worsening symptoms or any problems or concerns.

## 2022-05-13 RX ORDER — ERGOCALCIFEROL 1.25 MG/1
CAPSULE ORAL
Qty: 12 CAPSULE | Refills: 0 | Status: SHIPPED | OUTPATIENT
Start: 2022-05-13

## 2022-11-11 ENCOUNTER — OFFICE VISIT (OUTPATIENT)
Dept: FAMILY MEDICINE CLINIC | Facility: CLINIC | Age: 42
End: 2022-11-11

## 2022-11-11 VITALS
BODY MASS INDEX: 29.52 KG/M2 | DIASTOLIC BLOOD PRESSURE: 80 MMHG | SYSTOLIC BLOOD PRESSURE: 118 MMHG | HEIGHT: 68 IN | TEMPERATURE: 98.4 F | WEIGHT: 194.8 LBS | HEART RATE: 80 BPM

## 2022-11-11 DIAGNOSIS — D50.9 IRON DEFICIENCY ANEMIA, UNSPECIFIED IRON DEFICIENCY ANEMIA TYPE: ICD-10-CM

## 2022-11-11 DIAGNOSIS — M15.9 OSTEOARTHRITIS OF MULTIPLE JOINTS, UNSPECIFIED OSTEOARTHRITIS TYPE: ICD-10-CM

## 2022-11-11 DIAGNOSIS — N89.8 VAGINAL DISCHARGE: ICD-10-CM

## 2022-11-11 DIAGNOSIS — J30.2 SEASONAL ALLERGIES: ICD-10-CM

## 2022-11-11 DIAGNOSIS — E55.9 VITAMIN D DEFICIENCY: ICD-10-CM

## 2022-11-11 DIAGNOSIS — Z83.3 FAMILY HISTORY OF DIABETES MELLITUS: ICD-10-CM

## 2022-11-11 DIAGNOSIS — I10 ESSENTIAL HYPERTENSION: ICD-10-CM

## 2022-11-11 DIAGNOSIS — E53.9 VITAMIN B DEFICIENCY: ICD-10-CM

## 2022-11-11 DIAGNOSIS — Z23 NEED FOR TDAP VACCINATION: ICD-10-CM

## 2022-11-11 DIAGNOSIS — Z00.00 ENCOUNTER FOR ANNUAL PHYSICAL EXAM: Primary | ICD-10-CM

## 2022-11-11 DIAGNOSIS — R53.83 OTHER FATIGUE: ICD-10-CM

## 2022-11-11 PROBLEM — D25.1 FIBROIDS, INTRAMURAL: Status: ACTIVE | Noted: 2018-05-07

## 2022-11-11 PROBLEM — D50.0 ANEMIA, BLOOD LOSS: Status: ACTIVE | Noted: 2018-04-27

## 2022-11-11 LAB
25(OH)D3 SERPL-MCNC: 42.5 NG/ML (ref 30–100)
ALBUMIN SERPL-MCNC: 4.2 G/DL (ref 3.5–5.2)
ALBUMIN/GLOB SERPL: 1.4 G/DL
ALP SERPL-CCNC: 94 U/L (ref 39–117)
ALT SERPL W P-5'-P-CCNC: 16 U/L (ref 1–33)
AMORPH URATE CRY URNS QL MICRO: ABNORMAL /HPF
ANION GAP SERPL CALCULATED.3IONS-SCNC: 7.3 MMOL/L (ref 5–15)
AST SERPL-CCNC: 16 U/L (ref 1–32)
BACTERIA UR QL AUTO: ABNORMAL /HPF
BILIRUB SERPL-MCNC: <0.2 MG/DL (ref 0–1.2)
BILIRUB UR QL STRIP: NEGATIVE
BUN SERPL-MCNC: 10 MG/DL (ref 6–20)
BUN/CREAT SERPL: 13 (ref 7–25)
CALCIUM SPEC-SCNC: 9.2 MG/DL (ref 8.6–10.5)
CHLORIDE SERPL-SCNC: 105 MMOL/L (ref 98–107)
CLARITY UR: ABNORMAL
CLUE CELLS SPEC QL WET PREP: ABNORMAL
CO2 SERPL-SCNC: 27.7 MMOL/L (ref 22–29)
COLOR UR: YELLOW
CREAT SERPL-MCNC: 0.77 MG/DL (ref 0.57–1)
EGFRCR SERPLBLD CKD-EPI 2021: 97.7 ML/MIN/1.73
ERYTHROCYTE [DISTWIDTH] IN BLOOD BY AUTOMATED COUNT: 13.1 % (ref 12.3–15.4)
GLOBULIN UR ELPH-MCNC: 2.9 GM/DL
GLUCOSE SERPL-MCNC: 81 MG/DL (ref 65–99)
GLUCOSE UR STRIP-MCNC: NEGATIVE MG/DL
HCT VFR BLD AUTO: 38.6 % (ref 34–46.6)
HCV AB SER DONR QL: NORMAL
HGB BLD-MCNC: 12.4 G/DL (ref 12–15.9)
HGB UR QL STRIP.AUTO: NEGATIVE
HIV1+2 AB SER QL: NORMAL
HYALINE CASTS UR QL AUTO: ABNORMAL /LPF
HYDATID CYST SPEC WET PREP: ABNORMAL
IRON 24H UR-MRATE: 56 MCG/DL (ref 37–145)
IRON SATN MFR SERPL: 17 % (ref 20–50)
KETONES UR QL STRIP: NEGATIVE
LEUKOCYTE ESTERASE UR QL STRIP.AUTO: NEGATIVE
LYMPHOCYTES # BLD AUTO: 1.9 10*3/MM3 (ref 0.7–3.1)
LYMPHOCYTES NFR BLD AUTO: 29.3 % (ref 19.6–45.3)
MCH RBC QN AUTO: 28.8 PG (ref 26.6–33)
MCHC RBC AUTO-ENTMCNC: 32.1 G/DL (ref 31.5–35.7)
MCV RBC AUTO: 89.7 FL (ref 79–97)
MONOCYTES # BLD AUTO: 0.4 10*3/MM3 (ref 0.1–0.9)
MONOCYTES NFR BLD AUTO: 6.6 % (ref 5–12)
NEUTROPHILS NFR BLD AUTO: 4.2 10*3/MM3 (ref 1.7–7)
NEUTROPHILS NFR BLD AUTO: 64.1 % (ref 42.7–76)
NITRITE UR QL STRIP: NEGATIVE
PH UR STRIP.AUTO: 7.5 [PH] (ref 4.6–8)
PLATELET # BLD AUTO: 317 10*3/MM3 (ref 140–450)
PMV BLD AUTO: 6.8 FL (ref 6–12)
POTASSIUM SERPL-SCNC: 4.3 MMOL/L (ref 3.5–5.2)
PROT SERPL-MCNC: 7.1 G/DL (ref 6–8.5)
PROT UR QL STRIP: NEGATIVE
RBC # BLD AUTO: 4.3 10*6/MM3 (ref 3.77–5.28)
RBC # UR STRIP: ABNORMAL /HPF
REF LAB TEST METHOD: ABNORMAL
SODIUM SERPL-SCNC: 140 MMOL/L (ref 136–145)
SP GR UR STRIP: 1.02 (ref 1–1.03)
SQUAMOUS #/AREA URNS HPF: ABNORMAL /HPF
T VAGINALIS SPEC QL WET PREP: ABNORMAL
TIBC SERPL-MCNC: 337 MCG/DL (ref 298–536)
TRANSFERRIN SERPL-MCNC: 226 MG/DL (ref 200–360)
TSH SERPL DL<=0.05 MIU/L-ACNC: 1.29 UIU/ML (ref 0.27–4.2)
UROBILINOGEN UR QL STRIP: ABNORMAL
VIT B12 BLD-MCNC: 397 PG/ML (ref 211–946)
WBC # UR STRIP: ABNORMAL /HPF
WBC NRBC COR # BLD: 6.5 10*3/MM3 (ref 3.4–10.8)
WBC SPEC QL WET PREP: ABNORMAL
YEAST GENITAL QL WET PREP: ABNORMAL

## 2022-11-11 PROCEDURE — 83540 ASSAY OF IRON: CPT | Performed by: NURSE PRACTITIONER

## 2022-11-11 PROCEDURE — 82607 VITAMIN B-12: CPT | Performed by: NURSE PRACTITIONER

## 2022-11-11 PROCEDURE — 84443 ASSAY THYROID STIM HORMONE: CPT | Performed by: NURSE PRACTITIONER

## 2022-11-11 PROCEDURE — 86803 HEPATITIS C AB TEST: CPT | Performed by: NURSE PRACTITIONER

## 2022-11-11 PROCEDURE — 86592 SYPHILIS TEST NON-TREP QUAL: CPT | Performed by: NURSE PRACTITIONER

## 2022-11-11 PROCEDURE — 82306 VITAMIN D 25 HYDROXY: CPT | Performed by: NURSE PRACTITIONER

## 2022-11-11 PROCEDURE — 84466 ASSAY OF TRANSFERRIN: CPT | Performed by: NURSE PRACTITIONER

## 2022-11-11 PROCEDURE — 80053 COMPREHEN METABOLIC PANEL: CPT | Performed by: NURSE PRACTITIONER

## 2022-11-11 PROCEDURE — 87210 SMEAR WET MOUNT SALINE/INK: CPT | Performed by: NURSE PRACTITIONER

## 2022-11-11 PROCEDURE — G0432 EIA HIV-1/HIV-2 SCREEN: HCPCS | Performed by: NURSE PRACTITIONER

## 2022-11-11 PROCEDURE — 90471 IMMUNIZATION ADMIN: CPT | Performed by: NURSE PRACTITIONER

## 2022-11-11 PROCEDURE — 99213 OFFICE O/P EST LOW 20 MIN: CPT | Performed by: NURSE PRACTITIONER

## 2022-11-11 PROCEDURE — 90715 TDAP VACCINE 7 YRS/> IM: CPT | Performed by: NURSE PRACTITIONER

## 2022-11-11 PROCEDURE — 81001 URINALYSIS AUTO W/SCOPE: CPT | Performed by: NURSE PRACTITIONER

## 2022-11-11 PROCEDURE — 85025 COMPLETE CBC W/AUTO DIFF WBC: CPT | Performed by: NURSE PRACTITIONER

## 2022-11-11 PROCEDURE — 99396 PREV VISIT EST AGE 40-64: CPT | Performed by: NURSE PRACTITIONER

## 2022-11-11 RX ORDER — FLUTICASONE PROPIONATE 50 MCG
2 SPRAY, SUSPENSION (ML) NASAL DAILY
Qty: 16 ML | Refills: 3 | Status: SHIPPED | OUTPATIENT
Start: 2022-11-11

## 2022-11-11 RX ORDER — TRIAMTERENE AND HYDROCHLOROTHIAZIDE 37.5; 25 MG/1; MG/1
1 CAPSULE ORAL DAILY
Qty: 90 CAPSULE | Refills: 0 | Status: SHIPPED | OUTPATIENT
Start: 2022-11-11

## 2022-11-11 RX ORDER — MELOXICAM 15 MG/1
15 TABLET ORAL DAILY
Qty: 90 TABLET | Refills: 3 | Status: SHIPPED | OUTPATIENT
Start: 2022-11-11

## 2022-11-11 RX ORDER — LORATADINE AND PSEUDOEPHEDRINE SULFATE 5; 120 MG/1; MG/1
1 TABLET, EXTENDED RELEASE ORAL DAILY PRN
Qty: 90 TABLET | Refills: 3 | Status: SHIPPED | OUTPATIENT
Start: 2022-11-11 | End: 2023-01-16 | Stop reason: SDUPTHER

## 2022-11-11 NOTE — PROGRESS NOTES
"Chief Complaint  Vaginal Discharge (White- was on amoxil in September and was given diflucan and flagyl due to bv and yeast inf- didn't clear up all the way), Vaginal Itching, hot and cold flashes, and Fatigue    Subjective        Sonja Sierra presents to [facility]  History of Present Illness   42 yr old female presenting for annual exam,  single, lives alone, exercises occasionally, does not smoke. She has complaints of vaginal discharge and itching. She was treated on 8/22/22 for Trichomonas and BV, then treated again for BV on 9/20/22. Will repeat STD/STI panel and wet prep. Also complaining of fatigue, hx MEÑO, will check Iron Panel. Reports taking Meloxicam for joint pain.      With HTN, taking Dyazide 37.5/25 mg daily, denies CP, SOA, CP, HA dizziness, LE edema.     With seasonal allergies, taking Flonase and Claritin D, recommended refraining from products containing pseudoephedrine R/T HTN.     History of Vitamin D deficiency, previously taking Vitamin D 50,000 units, will repeat.      Objective   Vital Signs:  /80 (BP Location: Left arm, Patient Position: Sitting, Cuff Size: Large Adult)   Pulse 80   Temp 98.4 °F (36.9 °C) (Infrared)   Ht 172.7 cm (68\")   Wt 88.4 kg (194 lb 12.8 oz)   BMI 29.62 kg/m²   Estimated body mass index is 29.62 kg/m² as calculated from the following:    Height as of this encounter: 172.7 cm (68\").    Weight as of this encounter: 88.4 kg (194 lb 12.8 oz).    BMI is >= 25 and <30. (Overweight) The following options were offered after discussion;: weight loss educational material (shared in after visit summary)      Physical Exam  HENT:      Head: Normocephalic.   Eyes:      Pupils: Pupils are equal, round, and reactive to light.   Cardiovascular:      Rate and Rhythm: Normal rate.      Pulses: Normal pulses.      Heart sounds: Normal heart sounds.   Pulmonary:      Effort: Pulmonary effort is normal.      Breath sounds: Normal breath " sounds.   Abdominal:      General: Bowel sounds are normal.      Palpations: Abdomen is soft.   Genitourinary:     Labia:         Right: No rash or tenderness.         Left: No rash or tenderness.       Cervix: Discharge present.   Musculoskeletal:         General: Normal range of motion.   Skin:     General: Skin is warm.   Neurological:      General: No focal deficit present.      Mental Status: She is alert and oriented to person, place, and time.   Psychiatric:         Mood and Affect: Mood normal.         Behavior: Behavior normal.         Thought Content: Thought content normal.         Judgment: Judgment normal.        Result Review :                Assessment and Plan   Diagnoses and all orders for this visit:    1. Encounter for annual physical exam (Primary)  -     CBC & Differential  -     Comprehensive Metabolic Panel  -     Lipid Panel; Future  -     TSH  -     Hepatitis C antibody    2. Seasonal allergies  -     fluticasone (FLONASE) 50 MCG/ACT nasal spray; 2 sprays by Each Nare route Daily.  Dispense: 16 mL; Refill: 3  -     loratadine-pseudoephedrine (Claritin-D 12 Hour) 5-120 MG per 12 hr tablet; Take 1 tablet by mouth Daily As Needed for Allergies.  Dispense: 90 tablet; Refill: 3    3. Vaginal discharge  -     Urinalysis With Microscopic - Urine, Clean Catch  -     HSV 1 Antibody, IgG  -     HSV 2 Antibody, IgG  -     RPR  -     Cancel: SHAD Prep - Swab, Vagina  -     HIV-1 / O / 2 Ag / Antibody 4th Generation  -     triamterene-hydrochlorothiazide (DYAZIDE) 37.5-25 MG per capsule; Take 1 capsule by mouth Daily.  Dispense: 90 capsule; Refill: 0  -     Wet Prep, Genital - Swab, Vagina; Future  -     Wet Prep, Genital - Swab, Vagina    4. Essential hypertension    5. Other fatigue  -     Iron and TIBC  -     Hemoglobin A1c; Future    6. Iron deficiency anemia, unspecified iron deficiency anemia type  -     Iron and TIBC    7. Vitamin D deficiency  -     Vitamin D,25-Hydroxy    8. Vitamin B  deficiency  -     Vitamin B12    9. Osteoarthritis of multiple joints, unspecified osteoarthritis type  -     meloxicam (MOBIC) 15 MG tablet; Take 1 tablet by mouth Daily.  Dispense: 90 tablet; Refill: 3    10. Family history of diabetes mellitus  -     Hemoglobin A1c; Future    11. Need for Tdap vaccination  -     Tdap Vaccine Greater Than or Equal To 8yo IM    Counseling was provided on nutrition, physical activity, development, and injury prevention, dental health, and safe sex practices patient verbalizes understanding no additional questions were asked.       Follow Up   No follow-ups on file.  Patient was given instructions and counseling regarding her condition or for health maintenance advice. Please see specific information pulled into the AVS if appropriate.     Schedule an appointment for lab only and come fasting. Will notify of lab results and when to follow-up. Recommend follow-up appointment with OB/GYN.     Mask and gloves worn

## 2022-11-11 NOTE — PATIENT INSTRUCTIONS
Schedule an appointment for lab only and come fasting. Will notify of lab results and when to follow-up. Recommend follow-up appointment with OB/GYN.

## 2022-11-12 LAB
HSV1 IGG SER IA-ACNC: 36.8 INDEX (ref 0–0.9)
HSV2 IGG SER IA-ACNC: <0.91 INDEX (ref 0–0.9)
RPR SER QL: NORMAL

## 2022-11-13 RX ORDER — METRONIDAZOLE 500 MG/1
500 TABLET ORAL 2 TIMES DAILY
Qty: 14 TABLET | Refills: 0 | Status: SHIPPED | OUTPATIENT
Start: 2022-11-13 | End: 2022-11-14 | Stop reason: SDUPTHER

## 2022-11-13 RX ORDER — ACYCLOVIR 400 MG/1
400 TABLET ORAL 3 TIMES DAILY
Qty: 21 TABLET | Refills: 0 | Status: SHIPPED | OUTPATIENT
Start: 2022-11-13 | End: 2022-11-14 | Stop reason: SDUPTHER

## 2022-11-14 ENCOUNTER — TELEPHONE (OUTPATIENT)
Dept: FAMILY MEDICINE CLINIC | Facility: CLINIC | Age: 42
End: 2022-11-14

## 2022-11-14 RX ORDER — DOCUSATE SODIUM 100 MG/1
100 CAPSULE, LIQUID FILLED ORAL 2 TIMES DAILY PRN
Qty: 90 CAPSULE | Refills: 1 | Status: SHIPPED | OUTPATIENT
Start: 2022-11-14

## 2022-11-14 RX ORDER — FLUCONAZOLE 150 MG/1
TABLET ORAL
Qty: 3 TABLET | Refills: 0 | Status: SHIPPED | OUTPATIENT
Start: 2022-11-14 | End: 2022-12-01

## 2022-11-14 RX ORDER — METRONIDAZOLE 500 MG/1
500 TABLET ORAL 2 TIMES DAILY
Qty: 14 TABLET | Refills: 0 | Status: SHIPPED | OUTPATIENT
Start: 2022-11-14 | End: 2023-01-11

## 2022-11-14 RX ORDER — ACYCLOVIR 400 MG/1
400 TABLET ORAL 3 TIMES DAILY
Qty: 21 TABLET | Refills: 0 | Status: SHIPPED | OUTPATIENT
Start: 2022-11-14 | End: 2022-11-21

## 2022-11-14 RX ORDER — DOCUSATE SODIUM 100 MG/1
100 CAPSULE, LIQUID FILLED ORAL 2 TIMES DAILY PRN
Qty: 90 CAPSULE | Refills: 1 | Status: SHIPPED | OUTPATIENT
Start: 2022-11-14 | End: 2022-11-14 | Stop reason: SDUPTHER

## 2022-11-14 NOTE — TELEPHONE ENCOUNTER
I have already sent with psych Rafael and Flagyl to [facility] listed on file.  Does she want Diflucan and stool softener sent to [facility] or [facility]?

## 2022-11-14 NOTE — TELEPHONE ENCOUNTER
Caller: Sonja Sierra    Relationship: Self    Best call back number: [phone]    What medication are you requesting: STOOL SOFTENER TO GO ALONG WITH HER IRON TABLET , PLUS DIFLUCAN FOR A YEAST INFECTION THAT HAPPENS AFTER SHE TAKES AN ANTIBIOTIC.  PLEASE ALSO SEND ANY ANTIBIOTICS TO THE [facility] LISTED BELOW.       If a prescription is needed, what is your preferred pharmacy and phone number: [facility] [id number] - [address] AT [facility] - [phone] [facility] [phone]      Additional notes:

## 2022-11-14 NOTE — TELEPHONE ENCOUNTER
Lm for pt to return call  HUB NEED TO KNOW IF SHE WANTS DIFLUCAN AND STOOL SOFTENER SENT TO [facility] OR [facility] WHERE OTHER MEDS WENT

## 2022-11-15 ENCOUNTER — TELEPHONE (OUTPATIENT)
Dept: FAMILY MEDICINE CLINIC | Facility: CLINIC | Age: 42
End: 2022-11-15

## 2022-11-15 NOTE — TELEPHONE ENCOUNTER
Caller: Sonja Sierra    Relationship: Self    Best call back number: [phone]    What is the best time to reach you: ANY TIME    Who are you requesting to speak with (clinical staff, provider,  specific staff member): YVONNE    What was the call regarding: PATIENT WOULD LIKE TO GET HER HSV 1 TEST REDONE. SHE SAYS SHE HAS NEVER HAD A COLD SORE OR ANY TYPE OF BREAK OUT SO SHE IS NOT SURE WHY IT CAME BACK POSITIVE. ALSO, SHE IS WANTING A STOOL SOFTENER WITH IRON SUPPLEMENT IN IT. THE [facility] THAT WAS CALLED IN DOES NOT HAVE IRON SUPPLEMENT IN IT.    PLEASE ADVISE    Do you require a callback: YES

## 2022-11-16 NOTE — TELEPHONE ENCOUNTER
Left information on voicemail, patient can call back and let us know if she wants to repeat the labs

## 2022-11-16 NOTE — TELEPHONE ENCOUNTER
A positive result could mean exposure or current.  I sent medication and due to elevated levels.  I can certainly repeat her labs but I cannot guarantee insurance will cover.

## 2022-11-16 NOTE — TELEPHONE ENCOUNTER
Yes I do not believe any stool softeners contain iron she will have to take medications separately.

## 2022-11-17 ENCOUNTER — TELEPHONE (OUTPATIENT)
Dept: FAMILY MEDICINE CLINIC | Facility: CLINIC | Age: 42
End: 2022-11-17

## 2022-11-17 NOTE — TELEPHONE ENCOUNTER
Caller: Sonja Sierra    Relationship: Self    Best call back number: [phone]    PATIENT IS RETURNING A MISSED CALL REGARDING REPEAT LABS.    SHE IS AT WORK AND GETS OFF AT 4.     SHE DOES WANT TO DO REPEAT LABS FOR HSV1. SHE IS COMING IN ON Monday 11/21/22 AND SHE WANTS THIS ADDED TO THAT PANEL     PLEASE LEAVE HER A DETAILED VOICEMAIL.

## 2022-11-18 ENCOUNTER — TELEPHONE (OUTPATIENT)
Dept: FAMILY MEDICINE CLINIC | Facility: CLINIC | Age: 42
End: 2022-11-18

## 2022-11-18 NOTE — TELEPHONE ENCOUNTER
Caller: Sonja Sierra    Relationship: Self    Best call back number: [phone]    What is the best time to reach you: ANYTIME    Who are you requesting to speak with (clinical staff, provider,  specific staff member): CLINICAL STAFF    Do you know the name of the person who called: PATIENT     What was the call regarding: PATIENT CALLED AND SAID THAT SHE DOES WANT TO COME IN FOR THE LAB ON Monday AND IS OK WITH THE POSSIBLE COST     SHE DID NOT WANT A CALLBACK    Do you require a callback: NO

## 2022-11-20 DIAGNOSIS — B00.9 PCR POSITIVE FOR HERPES SIMPLEX VIRUS TYPE 1 (HSV-1) DNA: Primary | ICD-10-CM

## 2022-11-20 DIAGNOSIS — Z11.59 PCR POSITIVE FOR HERPES SIMPLEX VIRUS TYPE 1 (HSV-1) DNA: Primary | ICD-10-CM

## 2022-11-21 ENCOUNTER — LAB (OUTPATIENT)
Dept: FAMILY MEDICINE CLINIC | Facility: CLINIC | Age: 42
End: 2022-11-21

## 2022-11-21 DIAGNOSIS — Z11.59 PCR POSITIVE FOR HERPES SIMPLEX VIRUS TYPE 1 (HSV-1) DNA: ICD-10-CM

## 2022-11-21 DIAGNOSIS — Z00.00 ENCOUNTER FOR ANNUAL PHYSICAL EXAM: ICD-10-CM

## 2022-11-21 DIAGNOSIS — B00.9 PCR POSITIVE FOR HERPES SIMPLEX VIRUS TYPE 1 (HSV-1) DNA: ICD-10-CM

## 2022-11-21 DIAGNOSIS — Z83.3 FAMILY HISTORY OF DIABETES MELLITUS: ICD-10-CM

## 2022-11-21 DIAGNOSIS — R53.83 OTHER FATIGUE: ICD-10-CM

## 2022-11-21 LAB
CHOLEST SERPL-MCNC: 192 MG/DL (ref 0–200)
HBA1C MFR BLD: 5.7 % (ref 4.8–5.6)
HDLC SERPL-MCNC: 86 MG/DL (ref 40–60)
LDLC SERPL CALC-MCNC: 94 MG/DL (ref 0–100)
LDLC/HDLC SERPL: 1.08 {RATIO}
TRIGL SERPL-MCNC: 67 MG/DL (ref 0–150)
VLDLC SERPL-MCNC: 12 MG/DL (ref 5–40)

## 2022-11-21 PROCEDURE — 36415 COLL VENOUS BLD VENIPUNCTURE: CPT | Performed by: NURSE PRACTITIONER

## 2022-11-21 PROCEDURE — 80061 LIPID PANEL: CPT | Performed by: NURSE PRACTITIONER

## 2022-11-21 PROCEDURE — 83036 HEMOGLOBIN GLYCOSYLATED A1C: CPT | Performed by: NURSE PRACTITIONER

## 2022-11-22 LAB — HSV1 IGG SER IA-ACNC: 39.3 INDEX (ref 0–0.9)

## 2022-12-01 ENCOUNTER — TELEPHONE (OUTPATIENT)
Dept: FAMILY MEDICINE CLINIC | Facility: CLINIC | Age: 42
End: 2022-12-01

## 2022-12-01 RX ORDER — FLUCONAZOLE 150 MG/1
150 TABLET ORAL ONCE
Qty: 1 TABLET | Refills: 0 | Status: SHIPPED | OUTPATIENT
Start: 2022-12-01 | End: 2022-12-01

## 2022-12-01 NOTE — TELEPHONE ENCOUNTER
PATIENT CALLED AND STATES THE VAGINAL IRRITATION AND WHITE DISCHARGE WITH SOME ITCHINESS    SHE HAD THIS A COUPLE WEEKS AGO AND NOT COMPLETELY GONE AWAY.    [facility] [id number] - [place], [place] - [address] [place] [address] AT [facility] - [phone]  - [phone]   [phone]    CALL BACK NUMBER [phone]

## 2023-01-11 ENCOUNTER — OFFICE VISIT (OUTPATIENT)
Dept: FAMILY MEDICINE CLINIC | Facility: CLINIC | Age: 43
End: 2023-01-11
Payer: COMMERCIAL

## 2023-01-11 VITALS
OXYGEN SATURATION: 98 % | DIASTOLIC BLOOD PRESSURE: 60 MMHG | RESPIRATION RATE: 15 BRPM | WEIGHT: 202 LBS | BODY MASS INDEX: 30.62 KG/M2 | SYSTOLIC BLOOD PRESSURE: 120 MMHG | HEIGHT: 68 IN | TEMPERATURE: 98 F

## 2023-01-11 DIAGNOSIS — N89.8 ITCHING IN THE VAGINAL AREA: ICD-10-CM

## 2023-01-11 DIAGNOSIS — B96.89 BV (BACTERIAL VAGINOSIS): Primary | ICD-10-CM

## 2023-01-11 DIAGNOSIS — N76.0 BV (BACTERIAL VAGINOSIS): Primary | ICD-10-CM

## 2023-01-11 PROCEDURE — 99213 OFFICE O/P EST LOW 20 MIN: CPT | Performed by: NURSE PRACTITIONER

## 2023-01-11 RX ORDER — FLUCONAZOLE 150 MG/1
150 TABLET ORAL ONCE
COMMUNITY
Start: 2022-12-01 | End: 2023-01-11

## 2023-01-11 RX ORDER — CLINDAMYCIN HYDROCHLORIDE 300 MG/1
300 CAPSULE ORAL 2 TIMES DAILY
Qty: 14 CAPSULE | Refills: 0 | Status: SHIPPED | OUTPATIENT
Start: 2023-01-11

## 2023-01-11 RX ORDER — FLUCONAZOLE 150 MG/1
TABLET ORAL
Qty: 3 TABLET | Refills: 1 | Status: SHIPPED | OUTPATIENT
Start: 2023-01-11

## 2023-01-11 NOTE — PROGRESS NOTES
"Chief Complaint  Rash (Itching 1 week present) and Vaginal Itching    Subjective        Sonja Sierra presents to [facility]  History of Present Illness   42-year-old female presenting with complaints of vaginal itching x1 week, she has a history of BV and vaginal Candida, states her symptoms are the same as previous BV infection, states has had the same partner for 9 months, reports no current odor or abnormal discharge, denies lesions or tenderness, however states this is how the last BV infection started with vaginal itching, will start clindamycin 300 mg twice daily x7 days along with Diflucan 150 mg x 1.  Also recommended she try RepHresh to help maintain healthy vaginal pH.     Objective   Vital Signs:  /60 (BP Location: Left arm, Patient Position: Sitting, Cuff Size: Adult)   Temp 98 °F (36.7 °C) (Infrared)   Resp 15   Ht 172 cm (67.72\")   Wt 91.6 kg (202 lb)   SpO2 98%   BMI 30.97 kg/m²   Estimated body mass index is 30.97 kg/m² as calculated from the following:    Height as of this encounter: 172 cm (67.72\").    Weight as of this encounter: 91.6 kg (202 lb).             Physical Exam  Cardiovascular:      Rate and Rhythm: Normal rate.      Pulses: Normal pulses.      Heart sounds: Normal heart sounds.   Pulmonary:      Effort: Pulmonary effort is normal.      Breath sounds: Normal breath sounds.   Neurological:      General: No focal deficit present.      Mental Status: She is alert and oriented to person, place, and time.   Psychiatric:         Mood and Affect: Mood normal.         Behavior: Behavior normal.        Result Review :                   Assessment and Plan   Diagnoses and all orders for this visit:    1. BV (bacterial vaginosis) (Primary)  Comments:  Recommend use of RepHresh to help maintain healthy vaginal pH.     Orders:  -     clindamycin (Cleocin) 300 MG capsule; Take 1 capsule by mouth 2 (Two) Times a Day.  Dispense: 14 capsule; Refill: 0    2. " Itching in the vaginal area  -     fluconazole (Diflucan) 150 MG tablet; Take 1 tablet today, then repeat again in 3 days x2.  Dispense: 3 tablet; Refill: 1             Follow Up   Return if symptoms worsen or fail to improve.  Patient was given instructions and counseling regarding her condition or for health maintenance advice. Please see specific information pulled into the AVS if appropriate.     Follow-up if symptoms not improved.     Mask and gloves worn    Answers for HPI/ROS submitted by the patient on 1/10/2023  What is the primary reason for your visit?: Vaginal Discharge/Irritation

## 2023-01-16 DIAGNOSIS — J30.2 SEASONAL ALLERGIES: ICD-10-CM

## 2023-01-16 RX ORDER — LORATADINE AND PSEUDOEPHEDRINE SULFATE 5; 120 MG/1; MG/1
1 TABLET, EXTENDED RELEASE ORAL DAILY PRN
Qty: 90 TABLET | Refills: 3 | Status: SHIPPED | OUTPATIENT
Start: 2023-01-16

## 2023-04-17 DIAGNOSIS — M15.9 OSTEOARTHRITIS OF MULTIPLE JOINTS, UNSPECIFIED OSTEOARTHRITIS TYPE: ICD-10-CM

## 2023-04-17 RX ORDER — MELOXICAM 15 MG/1
15 TABLET ORAL DAILY
Qty: 90 TABLET | Refills: 3 | Status: SHIPPED | OUTPATIENT
Start: 2023-04-17

## 2023-09-18 ENCOUNTER — OFFICE VISIT (OUTPATIENT)
Dept: FAMILY MEDICINE CLINIC | Facility: CLINIC | Age: 43
End: 2023-09-18
Payer: COMMERCIAL

## 2023-09-18 VITALS
OXYGEN SATURATION: 100 % | WEIGHT: 202.2 LBS | SYSTOLIC BLOOD PRESSURE: 110 MMHG | TEMPERATURE: 97 F | HEART RATE: 60 BPM | BODY MASS INDEX: 30.65 KG/M2 | HEIGHT: 68 IN | DIASTOLIC BLOOD PRESSURE: 78 MMHG

## 2023-09-18 DIAGNOSIS — N89.8 VAGINAL DISCHARGE: ICD-10-CM

## 2023-09-18 DIAGNOSIS — N76.0 BV (BACTERIAL VAGINOSIS): Primary | ICD-10-CM

## 2023-09-18 DIAGNOSIS — N89.8 ITCHING IN THE VAGINAL AREA: ICD-10-CM

## 2023-09-18 DIAGNOSIS — B96.89 BV (BACTERIAL VAGINOSIS): Primary | ICD-10-CM

## 2023-09-18 DIAGNOSIS — N89.8 VAGINAL ODOR: ICD-10-CM

## 2023-09-18 PROCEDURE — 99213 OFFICE O/P EST LOW 20 MIN: CPT

## 2023-09-18 RX ORDER — FLUCONAZOLE 150 MG/1
150 TABLET ORAL ONCE
Qty: 1 TABLET | Refills: 0 | Status: SHIPPED | OUTPATIENT
Start: 2023-09-18 | End: 2023-09-18

## 2023-09-18 RX ORDER — CLINDAMYCIN HYDROCHLORIDE 300 MG/1
300 CAPSULE ORAL 2 TIMES DAILY
Qty: 14 CAPSULE | Refills: 0 | Status: SHIPPED | OUTPATIENT
Start: 2023-09-18

## 2023-09-18 NOTE — PROGRESS NOTES
"Chief Complaint  Vaginitis (Pt is here today presents vaginal itching that has stopped but has a vaginal odor still.Pt has had this in the past.)    Subjective        Sonja Sierra presents to [facility]    History of Present Illness  43 year old female presents for vaginal itching and vaginal odor. Pt has history of bacterial vaginosis and vaginitis. Has had same sexual partner for over one year. Reports vaginal discharge. Denies dysuria, rash, or lesions. States that it feels like repeat episode of bacterial vaginosis. Clindamycin and diflucan sent to pharmacy. Wet prep performed in clinic. Will f/u with pt when lab results.     Objective   Vital Signs:  /78   Pulse 60   Temp 97 °F (36.1 °C)   Ht 172 cm (67.72\")   Wt 91.7 kg (202 lb 3.2 oz)   SpO2 100%   BMI 31.00 kg/m²   Estimated body mass index is 31 kg/m² as calculated from the following:    Height as of this encounter: 172 cm (67.72\").    Weight as of this encounter: 91.7 kg (202 lb 3.2 oz).       Physical Exam  Exam conducted with a chaperone present.   Constitutional:       Appearance: Normal appearance.   HENT:      Head: Normocephalic.   Eyes:      Conjunctiva/sclera: Conjunctivae normal.      Pupils: Pupils are equal, round, and reactive to light.   Cardiovascular:      Rate and Rhythm: Normal rate and regular rhythm.      Pulses: Normal pulses.   Pulmonary:      Effort: Pulmonary effort is normal.      Breath sounds: Normal breath sounds.   Genitourinary:     Pubic Area: No rash.       Labia:         Right: No rash, tenderness or lesion.         Left: No rash, tenderness or lesion.    Skin:     General: Skin is warm.   Neurological:      General: No focal deficit present.      Mental Status: She is alert and oriented to person, place, and time.   Psychiatric:         Mood and Affect: Mood normal.         Behavior: Behavior normal.          Result Review :  The following data was reviewed by: Miranda Rhoades, " APRN on 09/18/2023:  Common labs          11/11/2022    16:13 11/21/2022    12:06   Common Labs   Glucose 81     BUN 10     Creatinine 0.77     Sodium 140     Potassium 4.3     Chloride 105     Calcium 9.2     Albumin 4.20     Total Bilirubin <0.2     Alkaline Phosphatase 94     AST (SGOT) 16     ALT (SGPT) 16     WBC 6.50     Hemoglobin 12.4     Hematocrit 38.6     Platelets 317     Total Cholesterol  192    Triglycerides  67    HDL Cholesterol  86    LDL Cholesterol   94    Hemoglobin A1C  5.70      Data reviewed : labs           Assessment and Plan   Diagnoses and all orders for this visit:    1. BV (bacterial vaginosis) (Primary)  -     clindamycin (Cleocin) 300 MG capsule; Take 1 capsule by mouth 2 (Two) Times a Day.  Dispense: 14 capsule; Refill: 0    2. Itching in the vaginal area  -     Wet Prep, Genital - Swab, Vagina  -     fluconazole (Diflucan) 150 MG tablet; Take 1 tablet by mouth 1 (One) Time for 1 dose.  Dispense: 1 tablet; Refill: 0    3. Vaginal odor  -     Wet Prep, Genital - Swab, Vagina    4. Vaginal discharge  -     Wet Prep, Genital - Swab, Vagina             Follow Up   No follow-ups on file.  Patient was given instructions and counseling regarding her condition or for health maintenance advice. Please see specific information pulled into the AVS if appropriate.

## 2023-09-19 LAB
CLUE CELLS SPEC QL WET PREP: POSITIVE
T VAGINALIS SPEC QL WET PREP: POSITIVE
YEAST SPEC QL WET PREP: NEGATIVE

## 2023-09-20 ENCOUNTER — TELEPHONE (OUTPATIENT)
Dept: FAMILY MEDICINE CLINIC | Facility: CLINIC | Age: 43
End: 2023-09-20
Payer: COMMERCIAL

## 2023-09-20 NOTE — TELEPHONE ENCOUNTER
Caller: Sonja Sierra    Relationship: Self    Best call back number: [phone]    What is the best time to reach you: ANY    Who are you requesting to speak with (clinical staff, provider,  specific staff member): CLINICAL      What was the call regarding: PATIENT STATED THAT SHE HAS HAD TRICHOMONIASIS. THE FIRST TIME PATIENT HAD IT WAS A  YEAR AGO, AND SHE TOOK THE MEDICATION UNTIL SHE FINISHED IT, AND SHE HAS BEEN TESTED SINCE THEN IT HAS ALWAYS CAME BACK NEGATIVE. THIS LAST TEST I TOOK ON 9-18-23 CAME BACK POSITIVE, AND PATIENT WAS WONDERING IF THERE IS ANY POSSIBLE WAY IF SHE HAD USED A TOY THAT SHE HAD USED THE VERY FIRST TIME SHE GOT (TRICHOMONIASIS) IS IT POSSIBLE THAT SHE COULD'VE CONTRACTED FROM A TOY?    PLEASE ADVISE. PATIENT SEEN TAMMI DO AT VISIT ON 9-18-23.

## 2023-09-20 NOTE — TELEPHONE ENCOUNTER
Explained to patient trich cannot survive outside of a warm, wet environment for more than several hours. Advised to talk to partner and take ABX to completion. If any mores symptoms or concerns, let us know.

## 2023-09-20 NOTE — TELEPHONE ENCOUNTER
Also informed trich may hang around without detection. May not have been fully gone at previous testing.

## 2023-09-25 ENCOUNTER — TELEPHONE (OUTPATIENT)
Dept: FAMILY MEDICINE CLINIC | Facility: CLINIC | Age: 43
End: 2023-09-25

## 2023-09-25 NOTE — TELEPHONE ENCOUNTER
Caller: Sonja Sierra     Best call back number: [phone]    What is your medical concern? PATIENT STATES THAT SHE HAS HAD BURNING SENSATION INSIDE OF HER     How long has this issue been going on? 09/18    Is your provider already aware of this issue? YES    Have you been treated for this issue? YES    PATIENT STATES THAT SHE SPOKE WITH SOMEONE ON FRIDAY. SHE STATES THAT SHE IS STILL HAVING THESE SYMPTOMS. SHE STATES THAT SHE WAS ADVISED TO CALL BACK ON MONDAY IF HER SYMPTOMS WERE STILL HAPPENING. PLEASE SEND FLAGINAL (SHE HAS HAD THIS FOR A YEAST INFECTION) TO [facility] [phone] - [facility] [phone] [place] [facility] [place] [address] [facility] [phone] [facility] [phone] FX

## 2023-09-27 ENCOUNTER — TELEPHONE (OUTPATIENT)
Dept: FAMILY MEDICINE CLINIC | Facility: CLINIC | Age: 43
End: 2023-09-27
Payer: COMMERCIAL

## 2023-09-27 NOTE — TELEPHONE ENCOUNTER
Pt confirmed she has finished clindamycin, pt states she is now having signes of yeast infection and needs medication sent to pharmacy.

## 2023-09-28 ENCOUNTER — TELEPHONE (OUTPATIENT)
Dept: FAMILY MEDICINE CLINIC | Facility: CLINIC | Age: 43
End: 2023-09-28
Payer: COMMERCIAL

## 2023-09-28 DIAGNOSIS — N89.8 ITCHING IN THE VAGINAL AREA: ICD-10-CM

## 2023-09-28 RX ORDER — FLUCONAZOLE 150 MG/1
TABLET ORAL
Qty: 3 TABLET | Refills: 0 | Status: SHIPPED | OUTPATIENT
Start: 2023-09-28

## 2023-11-02 ENCOUNTER — OFFICE VISIT (OUTPATIENT)
Dept: FAMILY MEDICINE CLINIC | Facility: CLINIC | Age: 43
End: 2023-11-02
Payer: COMMERCIAL

## 2023-11-02 VITALS
DIASTOLIC BLOOD PRESSURE: 80 MMHG | SYSTOLIC BLOOD PRESSURE: 118 MMHG | HEIGHT: 68 IN | TEMPERATURE: 98.4 F | HEART RATE: 60 BPM | OXYGEN SATURATION: 99 % | BODY MASS INDEX: 32.28 KG/M2 | WEIGHT: 213 LBS

## 2023-11-02 DIAGNOSIS — J30.2 SEASONAL ALLERGIES: ICD-10-CM

## 2023-11-02 DIAGNOSIS — H65.03 NON-RECURRENT ACUTE SEROUS OTITIS MEDIA OF BOTH EARS: ICD-10-CM

## 2023-11-02 DIAGNOSIS — J02.9 SORE THROAT: ICD-10-CM

## 2023-11-02 DIAGNOSIS — J01.10 ACUTE NON-RECURRENT FRONTAL SINUSITIS: Primary | ICD-10-CM

## 2023-11-02 LAB
EXPIRATION DATE: NORMAL
EXPIRATION DATE: NORMAL
FLUAV AG UPPER RESP QL IA.RAPID: NOT DETECTED
FLUBV AG UPPER RESP QL IA.RAPID: NOT DETECTED
INTERNAL CONTROL: NORMAL
INTERNAL CONTROL: NORMAL
Lab: NORMAL
Lab: NORMAL
S PYO AG THROAT QL: NEGATIVE
SARS-COV-2 AG UPPER RESP QL IA.RAPID: NOT DETECTED

## 2023-11-02 PROCEDURE — 87428 SARSCOV & INF VIR A&B AG IA: CPT

## 2023-11-02 PROCEDURE — 87880 STREP A ASSAY W/OPTIC: CPT

## 2023-11-02 PROCEDURE — 99214 OFFICE O/P EST MOD 30 MIN: CPT

## 2023-11-02 RX ORDER — AMOXICILLIN 875 MG/1
875 TABLET, COATED ORAL 2 TIMES DAILY
Qty: 14 TABLET | Refills: 0 | Status: SHIPPED | OUTPATIENT
Start: 2023-11-02 | End: 2023-11-09

## 2023-11-02 RX ORDER — FLUCONAZOLE 150 MG/1
TABLET ORAL
Qty: 2 TABLET | Refills: 0 | Status: SHIPPED | OUTPATIENT
Start: 2023-11-02

## 2023-11-02 RX ORDER — FLUTICASONE PROPIONATE 50 MCG
2 SPRAY, SUSPENSION (ML) NASAL DAILY
Qty: 16 ML | Refills: 3 | Status: SHIPPED | OUTPATIENT
Start: 2023-11-02

## 2023-11-02 NOTE — PROGRESS NOTES
"Chief Complaint  Sore Throat (Pt is here today presents sore throat and sinus pressure, ears have clogged feeling for about a week .)    Subjective        Sonja Sierra presents to [facility]    History of Present Illness  43 year old female presents for sore throat, sinus pressure, ear pressure, and hoarseness. Pt has been drinking hot tea with lemon and honey for her throat symptoms. Pt states she has history of sinus infections. Frontal sinus pressure and serous otitis found on examination. POC flu, covid, and strep performed in clinic with negative results. Flonase sent to pharmacy. Amoxicillin sent to pharmacy, diflucan sent for yeast infections with antibiotic use. Pt to return to clinic if symptoms do not improve or worsen after course of antibiotics.       Objective   Vital Signs:  /80   Pulse 60   Temp 98.4 °F (36.9 °C)   Ht 172 cm (67.72\")   Wt 96.6 kg (213 lb)   SpO2 99%   BMI 32.65 kg/m²   Estimated body mass index is 32.65 kg/m² as calculated from the following:    Height as of this encounter: 172 cm (67.72\").    Weight as of this encounter: 96.6 kg (213 lb).           Physical Exam  Constitutional:       Appearance: Normal appearance.   HENT:      Head: Normocephalic.      Nose: Congestion present.      Right Sinus: Frontal sinus tenderness present. No maxillary sinus tenderness.      Left Sinus: Frontal sinus tenderness present. No maxillary sinus tenderness.      Mouth/Throat:      Mouth: Mucous membranes are moist.      Pharynx: No oropharyngeal exudate or posterior oropharyngeal erythema.   Eyes:      Conjunctiva/sclera: Conjunctivae normal.      Pupils: Pupils are equal, round, and reactive to light.   Cardiovascular:      Rate and Rhythm: Normal rate and regular rhythm.      Pulses: Normal pulses.      Heart sounds: Normal heart sounds.   Pulmonary:      Effort: Pulmonary effort is normal.      Breath sounds: Normal breath sounds.   Skin:     General: " Skin is warm.   Neurological:      General: No focal deficit present.      Mental Status: She is alert and oriented to person, place, and time.   Psychiatric:         Mood and Affect: Mood normal.         Behavior: Behavior normal.            Result Review :  The following data was reviewed by: STEPHANIE Uriostegui on 11/02/2023:  Common labs          11/11/2022    16:13 11/21/2022    12:06 4/19/2023    16:14 4/19/2023    16:28   Common Labs   Glucose 81    NEGATIVE       BUN 10       Creatinine 0.77       Sodium 140       Potassium 4.3       Chloride 105       Calcium 9.2       Albumin 4.20       Total Bilirubin <0.2       Alkaline Phosphatase 94       AST (SGOT) 16       ALT (SGPT) 16       WBC 6.50   5.6     NONE SEEN       Hemoglobin 12.4   13.2        Hematocrit 38.6   39.2        Platelets 317   319        Total Cholesterol  192      Triglycerides  67      HDL Cholesterol  86      LDL Cholesterol   94      Hemoglobin A1C  5.70         Details          This result is from an external source.             Data reviewed : labs             Assessment and Plan   Diagnoses and all orders for this visit:    1. Acute non-recurrent frontal sinusitis (Primary)  -     amoxicillin (AMOXIL) 875 MG tablet; Take 1 tablet by mouth 2 (Two) Times a Day for 7 days.  Dispense: 14 tablet; Refill: 0  -     fluconazole (Diflucan) 150 MG tablet; Take 1 tablet today then repeat again in 3 days.  Dispense: 2 tablet; Refill: 0    2. Sore throat  -     POCT rapid strep A  -     POCT SARS-CoV-2 Antigen LONNIE + Flu    3. Seasonal allergies  -     fluticasone (FLONASE) 50 MCG/ACT nasal spray; 2 sprays by Each Nare route Daily.  Dispense: 16 mL; Refill: 3    4. Non-recurrent acute serous otitis media of both ears  -     fluticasone (FLONASE) 50 MCG/ACT nasal spray; 2 sprays by Each Nare route Daily.  Dispense: 16 mL; Refill: 3             Follow Up   No follow-ups on file.  Patient was given instructions and counseling regarding her condition  or for health maintenance advice. Please see specific information pulled into the AVS if appropriate.

## 2023-11-16 ENCOUNTER — OFFICE VISIT (OUTPATIENT)
Dept: FAMILY MEDICINE CLINIC | Facility: CLINIC | Age: 43
End: 2023-11-16
Payer: COMMERCIAL

## 2023-11-16 VITALS
WEIGHT: 213.8 LBS | TEMPERATURE: 98 F | HEART RATE: 62 BPM | BODY MASS INDEX: 32.4 KG/M2 | HEIGHT: 68 IN | DIASTOLIC BLOOD PRESSURE: 70 MMHG | OXYGEN SATURATION: 100 % | SYSTOLIC BLOOD PRESSURE: 120 MMHG

## 2023-11-16 DIAGNOSIS — G89.29 CHRONIC MIDLINE LOW BACK PAIN WITHOUT SCIATICA: ICD-10-CM

## 2023-11-16 DIAGNOSIS — M51.24 THORACIC DISC HERNIATION: Primary | ICD-10-CM

## 2023-11-16 DIAGNOSIS — R51.9 NONINTRACTABLE HEADACHE, UNSPECIFIED CHRONICITY PATTERN, UNSPECIFIED HEADACHE TYPE: ICD-10-CM

## 2023-11-16 DIAGNOSIS — M54.50 CHRONIC MIDLINE LOW BACK PAIN WITHOUT SCIATICA: ICD-10-CM

## 2023-11-16 DIAGNOSIS — M47.816 LUMBAR FACET ARTHROPATHY: ICD-10-CM

## 2023-11-16 PROCEDURE — 99214 OFFICE O/P EST MOD 30 MIN: CPT

## 2023-11-16 NOTE — PROGRESS NOTES
"Chief Complaint  Back Pain (Pt is here today presents back pain since, mri showed herniated disc, pt confirms she has not had therapy for her back pain and would like a 2nd opinion. Pt states insurance will not pay for further physical therapy.)    Subjective        Sonja Sierra presents to [facility]    History of Present Illness  43 year old female presents for back pain. Pt states pain began in July. She had MRI performed 8/16/23, has results with her from [facility]. It shows T4-5, T5-6, T6-7, T8 disc herniation and lumbar facet arthropathy. Results copied and will scan into chart. She states she is being treated at [facility] PT but her insurance will not cover any further PT treatments. She would like second opinion on her condition for treatment. Her next appt with [facility] is 12/7. She states pain worsens with standing and movement. She reports that her numbness and tingling has improved, as well as the burning sensation in her back but she has constant back ache. She reports headaches but states she had them before accident. Referral to neurology made.   Back Pain        Objective   Vital Signs:  /70   Pulse 62   Temp 98 °F (36.7 °C)   Ht 172 cm (67.72\")   Wt 97 kg (213 lb 12.8 oz)   SpO2 100%   BMI 32.78 kg/m²   Estimated body mass index is 32.78 kg/m² as calculated from the following:    Height as of this encounter: 172 cm (67.72\").    Weight as of this encounter: 97 kg (213 lb 12.8 oz).       BMI is >= 30 and <35. (Class 1 Obesity). The following options were offered after discussion;: weight loss educational material (shared in after visit summary), exercise counseling/recommendations, and nutrition counseling/recommendations      Physical Exam  Constitutional:       Appearance: Normal appearance.   HENT:      Head: Normocephalic.   Eyes:      Conjunctiva/sclera: Conjunctivae normal.      Pupils: Pupils are equal, round, and reactive to light.   Cardiovascular: "      Rate and Rhythm: Normal rate and regular rhythm.      Pulses: Normal pulses.      Heart sounds: Normal heart sounds.   Pulmonary:      Effort: Pulmonary effort is normal.      Breath sounds: Normal breath sounds.   Musculoskeletal:         General: Tenderness present.      Lumbar back: Tenderness present. Decreased range of motion.   Skin:     General: Skin is warm.   Neurological:      General: No focal deficit present.      Mental Status: She is alert and oriented to person, place, and time.   Psychiatric:         Mood and Affect: Mood normal.         Behavior: Behavior normal.            Result Review :  The following data was reviewed by: STEPHANIE Uriostegui on 11/16/2023:  Common labs          4/19/2023    16:14 4/19/2023    16:28   Common Labs   Glucose  NEGATIVE       WBC 5.6     NONE SEEN       Hemoglobin 13.2        Hematocrit 39.2        Platelets 319           Details          This result is from an external source.             Data reviewed : labs             Assessment and Plan   Diagnoses and all orders for this visit:    1. Thoracic disc herniation (Primary)  -     Ambulatory Referral to Neurology    2. Chronic midline low back pain without sciatica  -     Ambulatory Referral to Neurology    3. Nonintractable headache, unspecified chronicity pattern, unspecified headache type  -     Ambulatory Referral to Neurology    4. Lumbar facet arthropathy  -     Ambulatory Referral to Neurology             Follow Up   No follow-ups on file.  Patient was given instructions and counseling regarding her condition or for health maintenance advice. Please see specific information pulled into the AVS if appropriate.

## 2023-12-07 ENCOUNTER — TELEPHONE (OUTPATIENT)
Dept: NEUROLOGY | Facility: CLINIC | Age: 43
End: 2023-12-07
Payer: COMMERCIAL

## 2023-12-12 ENCOUNTER — OFFICE VISIT (OUTPATIENT)
Dept: NEUROLOGY | Facility: CLINIC | Age: 43
End: 2023-12-12
Payer: COMMERCIAL

## 2023-12-12 VITALS
DIASTOLIC BLOOD PRESSURE: 78 MMHG | BODY MASS INDEX: 31.52 KG/M2 | HEIGHT: 68 IN | WEIGHT: 208 LBS | SYSTOLIC BLOOD PRESSURE: 122 MMHG

## 2023-12-12 DIAGNOSIS — G43.719 INTRACTABLE CHRONIC MIGRAINE WITHOUT AURA AND WITHOUT STATUS MIGRAINOSUS: ICD-10-CM

## 2023-12-12 DIAGNOSIS — M51.24 THORACIC DISC HERNIATION: Primary | ICD-10-CM

## 2023-12-12 PROCEDURE — 99214 OFFICE O/P EST MOD 30 MIN: CPT | Performed by: NURSE PRACTITIONER

## 2023-12-12 NOTE — LETTER
2023       No Recipients    Patient: Sonja Sierra   YOB: 1980   Date of Visit: 2023     Dear STEPHANIE Uriostegui:       Thank you for referring Sonja Sierra to me for evaluation. Below are the relevant portions of my assessment and plan of care.    If you have questions, please do not hesitate to call me. I look forward to following Sonja along with you.         Sincerely,        STEPHANIE Schulz        CC:   No Recipients    Musa Waters APRN  23 1422  Sign when Signing Visit  DOS: 2023  NAME: Sonja Sierra   : 1980  PCP: Miranda Rhoades APRN  Chief Complaint   Patient presents with   • Headache   • Back Pain      Referring MD: Miranda Rhoades APRN    Neurological Problem and Interval History:  43 y.o. female presenting for evaluation for headaches. She has had them for many years. She currently gets them about 2 days per week. The location varies. They are described as throbbing. She has nausea with them at times. Occasional photophobia and phonophobia. OTC medications do not help. The headaches can last for a few hours to multiple echevarria in a row. In the past I had given her Zomig nasal spray (did not help) as well as diclofenac in the past. The diclofenac did help.     She denies any known triggers.  Denies tinnitus. She has gained weight recently but attributes this to be off of work for 3 months after a back injury.     The patient's referral today was placed also for an evaluation for a herniated disc in her thoracic spine.  She states this is for second opinion after being seen by orthopedics after an injury that occurred at work about 3 months ago.  She was picking up heavy boxes and hurt her back.  She says she was having fairly significant back pain as well as pain into her leg after this.  The leg pain has resolved but she continues to have mid back pain on a daily basis.  This pain does radiate up towards the neck as  well.    Past Medical/Surgical Hx:  Past Medical History:   Diagnosis Date   • Allergic    • Arthralgia of multiple joints    • Arthritis    • Bilateral leg edema    • Chest pain    • Chest pain    • Iron deficiency anemia due to chronic blood loss    • Migraine    • Vitamin D deficiency      Past Surgical History:   Procedure Laterality Date   •  SECTION  2009   • HYSTERECTOMY     • SUBTOTAL HYSTERECTOMY     • UTERINE FIBROID SURGERY         Review of Systems:        Review of Systems   Musculoskeletal:  Positive for back pain, neck pain and neck stiffness.   Neurological:  Positive for headache. Negative for dizziness, tremors, seizures, syncope, facial asymmetry, speech difficulty, weakness, light-headedness, numbness, memory problem and confusion.      Medications    Current Outpatient Medications:   •  loratadine-pseudoephedrine (Claritin-D 12 Hour) 5-120 MG per 12 hr tablet, Take 1 tablet by mouth Daily As Needed for Allergies., Disp: 90 tablet, Rfl: 3  •  meloxicam (MOBIC) 15 MG tablet, Take 1 tablet by mouth Daily., Disp: 90 tablet, Rfl: 3  •  triamterene-hydrochlorothiazide (DYAZIDE) 37.5-25 MG per capsule, Take 1 capsule by mouth Daily., Disp: 90 capsule, Rfl: 0  •  vitamin D (ERGOCALCIFEROL) 1.25 MG (08190 UT) capsule capsule, TAKE 1 CAPSULE BY MOUTH 1 TIME PER WEEK., Disp: 12 capsule, Rfl: 0  •  docusate sodium (Colace) 100 MG capsule, Take 1 capsule by mouth 2 (Two) Times a Day As Needed for Constipation. (Patient not taking: Reported on 2023), Disp: 90 capsule, Rfl: 1     Allergies:  Allergies   Allergen Reactions   • Azithromycin      Swelling,hives,itching  Swelling,hives,itching   • Morphine Itching   • Sulfamethoxazole-Trimethoprim      Swelling, hives,itching  Swelling, hives,itching       Social Hx:  Social History     Socioeconomic History   • Marital status: Single   Tobacco Use   • Smoking status: Never   • Smokeless tobacco: Never   Substance and Sexual Activity   • Alcohol  use: Yes     Comment: Occasionally   • Drug use: No   • Sexual activity: Yes     Partners: Male     Comment: Condom sometimes       Family Hx:  Family History   Problem Relation Age of Onset   • Hypertension Mother    • Gout Mother    • Arthritis Mother    • Hypertension Father    • Diabetes Father        I have reviewed and confirmed the accuracy of the patient's history: Chief complaint, HPI, ROS, Subjective, and Past Family Social History as entered by the MA/MICHAELN/RN. Zenaida Arita MA 12/12/23      Review of Imaging (Interpretation of scans not reports):      Laboratory Results:       Physical Examination:  General Appearance:  Well developed, well nourished, well groomed, alert, and cooperative.  HEENT: Normocephalic.    Neck and Spine: Normal range of motion.  Normal alignment. No mass or tenderness. No bruits.  Cardiac: Regular rate and rhythm. No murmurs.  Peripheral Vasculature: Radial and pedal pulses are equal and symmetric.   Extremities: No edema or deformities. Normal joint ROM.  Skin: No rashes or birth marks.      Neurological examination:   Higher Integrative Function: Oriented to time, place, and person. Normal registration, recall attention span and concentration. Normal language including comprehension, spontaneous speech, repetition, reading, writing, naming and vocabulary. No neglect with normal visual-spatial function and construction. Normal fund of knowledge and higher integrative function.   CN II: Pupils are equal, round and reactive to light. Normal visual acuity and visual fields.   CN III IV VI: Extraocular movements are full without nystagmus.   CN V: Normal facial sensation and strength of muscles of mastication.   CN VII: Facial movements are symmetric. No weakness.   CN VIII: Auditory acuity is normal.  CN IX & X: Symmetric palatal movement.   CN XI: Sternocleidomastoid and trapezius are normal. No weakness.   CN XII: The tongue is midline. No atrophy or fasciculations.  Motor: Normal  muscle strength, bulk and tone in upper and lower extremities. No fasciculations, rigidity, spasticity, or abnormal movements.   Reflexes: 2+ in the upper and lower extremities. Plantar responses are flexor.   Sensation: Normal light touch, pinprick, vibration, temperature, and proprioception in the arms and legs.   Station and Gait: Normal gait and station.   Coordination: Finger to nose test shows no dysmetria. Rapid alternating movements are normal. Heel to shin is normal.            Diagnoses / Discussion:      Plan:       Diagnoses and all orders for this visit:    1. Thoracic disc herniation (Primary)  -     Ambulatory Referral to Neurosurgery    2. Intractable chronic migraine without aura and without status migrainosus    Migraine headaches: Gave samples of Nurtec to try as needed. Instructions given. SE reviewed. If this is helpful she will call for a prescription.  Back pain/thoracic disc herniation: Referral placed to neurosurgery for this.     Coding

## 2023-12-12 NOTE — PROGRESS NOTES
DOS: 2023  NAME: Sonja Sierra   : 1980  PCP: Miranda Rhoades APRN  Chief Complaint   Patient presents with    Headache    Back Pain      Referring MD: Miranda Rhoades APRN    Neurological Problem and Interval History:  43 y.o. female presenting for evaluation for headaches. She has had them for many years. She currently gets them about 2 days per week. The location varies. They are described as throbbing. She has nausea with them at times. Occasional photophobia and phonophobia. OTC medications do not help. The headaches can last for a few hours to multiple echevarria in a row. In the past I had given her Zomig nasal spray (did not help) as well as diclofenac in the past. The diclofenac did help.     She denies any known triggers.  Denies tinnitus. She has gained weight recently but attributes this to be off of work for 3 months after a back injury.     The patient's referral today was placed also for an evaluation for a herniated disc in her thoracic spine.  She states this is for second opinion after being seen by orthopedics after an injury that occurred at work about 3 months ago.  She was picking up heavy boxes and hurt her back.  She says she was having fairly significant back pain as well as pain into her leg after this.  The leg pain has resolved but she continues to have mid back pain on a daily basis.  This pain does radiate up towards the neck as well.    Past Medical/Surgical Hx:  Past Medical History:   Diagnosis Date    Allergic     Arthralgia of multiple joints     Arthritis     Bilateral leg edema     Chest pain     Chest pain     Iron deficiency anemia due to chronic blood loss     Migraine     Vitamin D deficiency      Past Surgical History:   Procedure Laterality Date     SECTION  2009    HYSTERECTOMY      SUBTOTAL HYSTERECTOMY      UTERINE FIBROID SURGERY         Review of Systems:        Review of Systems   Musculoskeletal:  Positive for back pain, neck pain and neck  stiffness.   Neurological:  Positive for headache. Negative for dizziness, tremors, seizures, syncope, facial asymmetry, speech difficulty, weakness, light-headedness, numbness, memory problem and confusion.      Medications    Current Outpatient Medications:     loratadine-pseudoephedrine (Claritin-D 12 Hour) 5-120 MG per 12 hr tablet, Take 1 tablet by mouth Daily As Needed for Allergies., Disp: 90 tablet, Rfl: 3    meloxicam (MOBIC) 15 MG tablet, Take 1 tablet by mouth Daily., Disp: 90 tablet, Rfl: 3    triamterene-hydrochlorothiazide (DYAZIDE) 37.5-25 MG per capsule, Take 1 capsule by mouth Daily., Disp: 90 capsule, Rfl: 0    vitamin D (ERGOCALCIFEROL) 1.25 MG (60675 UT) capsule capsule, TAKE 1 CAPSULE BY MOUTH 1 TIME PER WEEK., Disp: 12 capsule, Rfl: 0    docusate sodium (Colace) 100 MG capsule, Take 1 capsule by mouth 2 (Two) Times a Day As Needed for Constipation. (Patient not taking: Reported on 9/18/2023), Disp: 90 capsule, Rfl: 1     Allergies:  Allergies   Allergen Reactions    Azithromycin      Swelling,hives,itching  Swelling,hives,itching    Morphine Itching    Sulfamethoxazole-Trimethoprim      Swelling, hives,itching  Swelling, hives,itching       Social Hx:  Social History     Socioeconomic History    Marital status: Single   Tobacco Use    Smoking status: Never    Smokeless tobacco: Never   Substance and Sexual Activity    Alcohol use: Yes     Comment: Occasionally    Drug use: No    Sexual activity: Yes     Partners: Male     Comment: Condom sometimes       Family Hx:  Family History   Problem Relation Age of Onset    Hypertension Mother     Gout Mother     Arthritis Mother     Hypertension Father     Diabetes Father        I have reviewed and confirmed the accuracy of the patient's history: Chief complaint, HPI, ROS, Subjective, and Past Family Social History as entered by the MA/MARGIE/RN. Zenaida Arita MA 12/12/23      Review of Imaging (Interpretation of scans not reports):      Laboratory Results:        Physical Examination:  General Appearance:  Well developed, well nourished, well groomed, alert, and cooperative.  HEENT: Normocephalic.    Neck and Spine: Normal range of motion.  Normal alignment. No mass or tenderness. No bruits.  Cardiac: Regular rate and rhythm. No murmurs.  Peripheral Vasculature: Radial and pedal pulses are equal and symmetric.   Extremities: No edema or deformities. Normal joint ROM.  Skin: No rashes or birth marks.      Neurological examination:   Higher Integrative Function: Oriented to time, place, and person. Normal registration, recall attention span and concentration. Normal language including comprehension, spontaneous speech, repetition, reading, writing, naming and vocabulary. No neglect with normal visual-spatial function and construction. Normal fund of knowledge and higher integrative function.   CN II: Pupils are equal, round and reactive to light. Normal visual acuity and visual fields.   CN III IV VI: Extraocular movements are full without nystagmus.   CN V: Normal facial sensation and strength of muscles of mastication.   CN VII: Facial movements are symmetric. No weakness.   CN VIII: Auditory acuity is normal.  CN IX & X: Symmetric palatal movement.   CN XI: Sternocleidomastoid and trapezius are normal. No weakness.   CN XII: The tongue is midline. No atrophy or fasciculations.  Motor: Normal muscle strength, bulk and tone in upper and lower extremities. No fasciculations, rigidity, spasticity, or abnormal movements.   Reflexes: 2+ in the upper and lower extremities. Plantar responses are flexor.   Sensation: Normal light touch, pinprick, vibration, temperature, and proprioception in the arms and legs.   Station and Gait: Normal gait and station.   Coordination: Finger to nose test shows no dysmetria. Rapid alternating movements are normal. Heel to shin is normal.            Diagnoses / Discussion:      Plan:       Diagnoses and all orders for this visit:    1.  Thoracic disc herniation (Primary)  -     Ambulatory Referral to Neurosurgery    2. Intractable chronic migraine without aura and without status migrainosus    Migraine headaches: Gave samples of Nurtec to try as needed. Instructions given. SE reviewed. If this is helpful she will call for a prescription.  Back pain/thoracic disc herniation: Referral placed to neurosurgery for this.     Coding

## 2023-12-14 DIAGNOSIS — J01.10 ACUTE NON-RECURRENT FRONTAL SINUSITIS: ICD-10-CM

## 2023-12-18 RX ORDER — FLUCONAZOLE 150 MG/1
TABLET ORAL
Qty: 2 TABLET | Refills: 0 | OUTPATIENT
Start: 2023-12-18

## 2023-12-19 ENCOUNTER — PATIENT ROUNDING (BHMG ONLY) (OUTPATIENT)
Dept: NEUROLOGY | Facility: CLINIC | Age: 43
End: 2023-12-19
Payer: COMMERCIAL

## 2023-12-27 ENCOUNTER — TELEPHONE (OUTPATIENT)
Dept: FAMILY MEDICINE CLINIC | Facility: CLINIC | Age: 43
End: 2023-12-27
Payer: COMMERCIAL

## 2023-12-27 ENCOUNTER — TELEPHONE (OUTPATIENT)
Dept: NEUROLOGY | Facility: CLINIC | Age: 43
End: 2023-12-27
Payer: COMMERCIAL
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
## 2023-12-27 NOTE — TELEPHONE ENCOUNTER
PATIENT CALLING TO ADVISE, SAMPLE MEDICATION SHE WAS GIVEN WORKS WELL FOR HER, SHE WOULD LIKE A PRESCRIPTION      HER PHARMACY IS:    [facility] [id number] - [facility] [address] AT [facility] [address] [facility] [phone] [facility] [phone] FX

## 2023-12-28 ENCOUNTER — TELEPHONE (OUTPATIENT)
Dept: FAMILY MEDICINE CLINIC | Facility: CLINIC | Age: 43
End: 2023-12-28

## 2023-12-28 RX ORDER — RIMEGEPANT SULFATE 75 MG/75MG
1 TABLET, ORALLY DISINTEGRATING ORAL DAILY PRN
Qty: 16 TABLET | Refills: 5 | Status: SHIPPED | OUTPATIENT
Start: 2023-12-28

## 2023-12-28 NOTE — TELEPHONE ENCOUNTER
Caller: SHARA WITH [employer]    Best call back number: [phone]     What was the call regarding: PATIENT WAS ONCE RECEIVING OUTSIDE MEDICAL TREATMENT UNDER WORKERS COMPENSATION.    THE PATIENT HAS NOW CHOSEN TO SEEK INDEPENDENT MEDICAL CARE OUTSIDE OF WORKERS COMPENSATION.    THE PATIENT WAS TOLD THAT HER PROVIDERS WOULD NEED WRITTEN VERIFICATION FROM HER INSURANCE STATING THAT 'HER MEDICAL BILLS WILL NO LONGER BE COVERED BY WORKERS COMPENSATION' IN ORDER TO BE SEEN.    SHARA WILL WRITE A LETTER OF CONFIRMATION STATING THAT ABOVE.     THERE IS NO TEMPLATE FOR THIS DOCUMENTATION. SO SHARA WILL BE WRITING FROM [employer] WITH THE INFORMATION AND UNDERSTANDING THAT SHE HAS.     PLEASE CONTACT SHARA IF OTHER / A CHANGE OF DOCUMENTATION IS NEEDED.    HUB CONFIRMED [phone]

## 2024-01-03 ENCOUNTER — SPECIALTY PHARMACY (OUTPATIENT)
Dept: NEUROLOGY | Facility: CLINIC | Age: 44
End: 2024-01-03
Payer: COMMERCIAL

## 2024-01-09 ENCOUNTER — TELEPHONE (OUTPATIENT)
Dept: FAMILY MEDICINE CLINIC | Facility: CLINIC | Age: 44
End: 2024-01-09
Payer: COMMERCIAL

## 2024-01-09 NOTE — TELEPHONE ENCOUNTER
Confirmed with pt have not received anything if we received denial we will confirm and fax over to neuro surgery.

## 2024-01-09 NOTE — TELEPHONE ENCOUNTER
Caller: Sonja Sierra    Relationship: Self    Best call back number: [phone]     PATIENT STATES THAT SHE WAS REFERRED TO [facility] NEUROLOGIST.    THE NEUROLOGIST  OFFICE HAS BEEN TRYING TO CONTACT PRIMARY CARE TO LET THEM KNOW THAT THEY HAVE NOT RECEIVED ANY PAPERWORK FROM [email or web address] DENYING THE PATIENT'S CLAIM. INSURANCE KEEPS TELLING PATIENT THAT THEY HAVE FAXED PAPERWORK.  PATIENT WONDERS IF THIS PAPERWORK IS COMING TO THE PRIMARY CARE OFFICE INSTEAD OF NEUROLOGIST OFFICE.    ONCE NEUROLOGIST HAS RECEIVED A DENIAL FROM [employer] THEY WILL BE ABLE TO SEE THE PATIENT UNDER HER OWN INSURANCE COVERAGE BUT THEY ARE UNABLE TO TREAT HER UNTIL THEY HEAR FROM [employer].    PLEASE CALL TO DISCUSS.

## 2024-02-15 ENCOUNTER — OFFICE VISIT (OUTPATIENT)
Dept: NEUROSURGERY | Facility: CLINIC | Age: 44
End: 2024-02-15
Payer: COMMERCIAL

## 2024-02-15 ENCOUNTER — PATIENT ROUNDING (BHMG ONLY) (OUTPATIENT)
Dept: NEUROSURGERY | Facility: CLINIC | Age: 44
End: 2024-02-15
Payer: COMMERCIAL

## 2024-02-15 VITALS
HEART RATE: 96 BPM | DIASTOLIC BLOOD PRESSURE: 78 MMHG | SYSTOLIC BLOOD PRESSURE: 136 MMHG | HEIGHT: 67 IN | BODY MASS INDEX: 36.76 KG/M2 | RESPIRATION RATE: 16 BRPM | WEIGHT: 234.2 LBS

## 2024-02-15 DIAGNOSIS — M50.30 DDD (DEGENERATIVE DISC DISEASE), CERVICAL: ICD-10-CM

## 2024-02-15 DIAGNOSIS — M51.9 THORACIC DISC DISEASE: Primary | ICD-10-CM

## 2024-02-15 RX ORDER — METAXALONE 800 MG/1
800 TABLET ORAL
COMMUNITY
Start: 2024-01-19

## 2024-02-15 RX ORDER — FLUTICASONE PROPIONATE 50 MCG
SPRAY, SUSPENSION (ML) NASAL
COMMUNITY
Start: 2024-01-18

## 2024-02-16 ENCOUNTER — TELEPHONE (OUTPATIENT)
Dept: NEUROSURGERY | Facility: CLINIC | Age: 44
End: 2024-02-16
Payer: COMMERCIAL

## 2024-02-21 ENCOUNTER — TELEPHONE (OUTPATIENT)
Dept: NEUROSURGERY | Facility: CLINIC | Age: 44
End: 2024-02-21

## 2024-02-21 NOTE — TELEPHONE ENCOUNTER
Caller: Sonja Sierra    Relationship: Self    Best call back number: [phone]    What specialty or service is being requested: PT    What is the provider, practice or medical service name: [facility]    What is the office location: [address] [place] [address] (ZIP [address])    What is the office phone number: [phone]    Any additional details: REFERRAL WAS SENT TO [facility], BUT PATIENT WOULD ACTUALLY PREFER THIS FACILITY. PLEASE CALL TO ADVISE.

## 2024-03-29 ENCOUNTER — OFFICE VISIT (OUTPATIENT)
Dept: FAMILY MEDICINE CLINIC | Facility: CLINIC | Age: 44
End: 2024-03-29
Payer: COMMERCIAL

## 2024-03-29 VITALS
SYSTOLIC BLOOD PRESSURE: 130 MMHG | HEART RATE: 77 BPM | WEIGHT: 206 LBS | OXYGEN SATURATION: 98 % | HEIGHT: 67 IN | DIASTOLIC BLOOD PRESSURE: 88 MMHG | BODY MASS INDEX: 32.33 KG/M2 | TEMPERATURE: 98 F

## 2024-03-29 DIAGNOSIS — J30.2 SEASONAL ALLERGIES: ICD-10-CM

## 2024-03-29 DIAGNOSIS — J01.10 ACUTE NON-RECURRENT FRONTAL SINUSITIS: Primary | ICD-10-CM

## 2024-03-29 DIAGNOSIS — H65.03 NON-RECURRENT ACUTE SEROUS OTITIS MEDIA OF BOTH EARS: ICD-10-CM

## 2024-03-29 DIAGNOSIS — R11.0 NAUSEA: ICD-10-CM

## 2024-03-29 DIAGNOSIS — R09.81 SINUS CONGESTION: ICD-10-CM

## 2024-03-29 PROCEDURE — 99213 OFFICE O/P EST LOW 20 MIN: CPT

## 2024-03-29 PROCEDURE — 87428 SARSCOV & INF VIR A&B AG IA: CPT

## 2024-03-29 RX ORDER — FLUTICASONE PROPIONATE 50 MCG
2 SPRAY, SUSPENSION (ML) NASAL DAILY
Qty: 50 G | Refills: 3 | Status: SHIPPED | OUTPATIENT
Start: 2024-03-29

## 2024-03-29 RX ORDER — AMOXICILLIN AND CLAVULANATE POTASSIUM 875; 125 MG/1; MG/1
1 TABLET, FILM COATED ORAL 2 TIMES DAILY
Qty: 14 TABLET | Refills: 0 | Status: SHIPPED | OUTPATIENT
Start: 2024-03-29 | End: 2024-04-05

## 2024-03-29 RX ORDER — FLUCONAZOLE 150 MG/1
TABLET ORAL
Qty: 2 TABLET | Refills: 0 | Status: SHIPPED | OUTPATIENT
Start: 2024-03-29

## 2024-03-29 RX ORDER — LORATADINE AND PSEUDOEPHEDRINE SULFATE 5; 120 MG/1; MG/1
1 TABLET, EXTENDED RELEASE ORAL DAILY PRN
Qty: 90 TABLET | Refills: 3 | Status: SHIPPED | OUTPATIENT
Start: 2024-03-29

## 2024-03-29 RX ORDER — PHENAZOPYRIDINE HYDROCHLORIDE 100 MG/1
100 TABLET, FILM COATED ORAL
COMMUNITY
Start: 2024-01-13 | End: 2025-01-12

## 2024-03-29 NOTE — PROGRESS NOTES
"Chief Complaint  Sinus Problem (Pt is here today pt presents tension on head, sneezing and nauseated . Pt confirmes ear fullness as well.)    Subjective        Sonja Sierra presents to [facility] PRIMARY CARE    History of Present Illness  43 year old female presents for sinus congestion, nausea, sneezing, and headache that began Monday. Reports bilateral ear fullness sensation. POC testing for COVID and Flu negative. Return to clinic if symptoms do not improve or worsen after medication therapy.       Objective   Vital Signs:  /88   Pulse 77   Temp 98 °F (36.7 °C)   Ht 170.2 cm (67\")   Wt 93.4 kg (206 lb)   SpO2 98%   BMI 32.26 kg/m²   Estimated body mass index is 32.26 kg/m² as calculated from the following:    Height as of this encounter: 170.2 cm (67\").    Weight as of this encounter: 93.4 kg (206 lb).         Physical Exam  Constitutional:       Appearance: Normal appearance.   HENT:      Head: Normocephalic.      Right Ear: Hearing, tympanic membrane, ear canal and external ear normal. A middle ear effusion is present.      Left Ear: Hearing, tympanic membrane, ear canal and external ear normal. A middle ear effusion is present.      Nose:      Right Sinus: Frontal sinus tenderness present. No maxillary sinus tenderness.      Left Sinus: Frontal sinus tenderness present. No maxillary sinus tenderness.   Eyes:      Conjunctiva/sclera: Conjunctivae normal.      Pupils: Pupils are equal, round, and reactive to light.   Cardiovascular:      Rate and Rhythm: Normal rate and regular rhythm.      Pulses: Normal pulses.      Heart sounds: Normal heart sounds.   Pulmonary:      Effort: Pulmonary effort is normal.      Breath sounds: Normal breath sounds.   Skin:     General: Skin is warm.   Neurological:      General: No focal deficit present.      Mental Status: She is alert and oriented to person, place, and time.   Psychiatric:         Mood and Affect: Mood normal.         Behavior: " Behavior normal.            Result Review :    The following data was reviewed by: STEPHANIE Uriostegui on 03/29/2024:    Data reviewed : labs             Assessment and Plan     Diagnoses and all orders for this visit:    1. Acute non-recurrent frontal sinusitis (Primary)  -     POCT SARS-CoV-2 Antigen LONNIE + Flu  -     amoxicillin-clavulanate (AUGMENTIN) 875-125 MG per tablet; Take 1 tablet by mouth 2 (Two) Times a Day for 7 days.  Dispense: 14 tablet; Refill: 0  -     fluconazole (Diflucan) 150 MG tablet; Take 1 tablet today then repeat again in 3 days.  Dispense: 2 tablet; Refill: 0    2. Sinus congestion  -     POCT SARS-CoV-2 Antigen LONNIE + Flu  -     amoxicillin-clavulanate (AUGMENTIN) 875-125 MG per tablet; Take 1 tablet by mouth 2 (Two) Times a Day for 7 days.  Dispense: 14 tablet; Refill: 0  -     fluconazole (Diflucan) 150 MG tablet; Take 1 tablet today then repeat again in 3 days.  Dispense: 2 tablet; Refill: 0    3. Nausea  -     POCT SARS-CoV-2 Antigen LONNIE + Flu  -     amoxicillin-clavulanate (AUGMENTIN) 875-125 MG per tablet; Take 1 tablet by mouth 2 (Two) Times a Day for 7 days.  Dispense: 14 tablet; Refill: 0  -     fluconazole (Diflucan) 150 MG tablet; Take 1 tablet today then repeat again in 3 days.  Dispense: 2 tablet; Refill: 0    4. Non-recurrent acute serous otitis media of both ears  -     POCT SARS-CoV-2 Antigen LONNIE + Flu  -     amoxicillin-clavulanate (AUGMENTIN) 875-125 MG per tablet; Take 1 tablet by mouth 2 (Two) Times a Day for 7 days.  Dispense: 14 tablet; Refill: 0  -     fluconazole (Diflucan) 150 MG tablet; Take 1 tablet today then repeat again in 3 days.  Dispense: 2 tablet; Refill: 0             Follow Up     No follow-ups on file.  Patient was given instructions and counseling regarding her condition or for health maintenance advice. Please see specific information pulled into the AVS if appropriate.

## 2024-03-29 NOTE — LETTER
March 29, 2024     Patient: Sonja Sierra   YOB: 1980   Date of Visit: 3/29/2024       To Whom It May Concern:    It is my medical opinion that Sonja Sierra may return to work Monday 4/1/24.         Sincerely,        STEPHANIE Uriostegui    CC: No Recipients

## 2024-04-01 LAB
EXPIRATION DATE: NORMAL
FLUAV AG UPPER RESP QL IA.RAPID: NOT DETECTED
FLUBV AG UPPER RESP QL IA.RAPID: NOT DETECTED
INTERNAL CONTROL: NORMAL
Lab: NORMAL
SARS-COV-2 AG UPPER RESP QL IA.RAPID: NOT DETECTED

## 2024-04-10 DIAGNOSIS — J01.10 ACUTE NON-RECURRENT FRONTAL SINUSITIS: ICD-10-CM

## 2024-04-10 DIAGNOSIS — H65.03 NON-RECURRENT ACUTE SEROUS OTITIS MEDIA OF BOTH EARS: ICD-10-CM

## 2024-04-10 DIAGNOSIS — R11.0 NAUSEA: ICD-10-CM

## 2024-04-10 DIAGNOSIS — R09.81 SINUS CONGESTION: ICD-10-CM

## 2024-04-10 NOTE — TELEPHONE ENCOUNTER
Caller: Sonja Sierra    Relationship: Self    Best call back number: [phone]     Requested Prescriptions:   Requested Prescriptions     Pending Prescriptions Disp Refills    fluconazole (Diflucan) 150 MG tablet 2 tablet 0     Sig: Take 1 tablet today then repeat again in 3 days.        Pharmacy where request should be sent: [facility] [id number] [facility] [address] AT [facility] [address] [facility] [phone] [facility] [phone] FX     Last office visit with prescribing clinician: 3/29/2024   Last telemedicine visit with prescribing clinician: Visit date not found   Next office visit with prescribing clinician: Visit date not found     Additional details provided by patient: PATIENT IS ON AN ANTIBIOTIC THAT SHE STOPPED TAKING ON THURSDAY, BUT DUE TO HAVE TAKING ANTIBIOTIC SHE NOW HAS A YEAST INFECTION.     Does the patient have less than a 3 day supply:  [] Yes  [] No    Would you like a call back once the refill request has been completed: [] Yes [] No    If the office needs to give you a call back, can they leave a voicemail: [] Yes [] No    Laurel Farris Rep   04/10/24 08:10 EDT

## 2024-04-11 RX ORDER — FLUCONAZOLE 150 MG/1
TABLET ORAL
Qty: 2 TABLET | Refills: 0 | Status: SHIPPED | OUTPATIENT
Start: 2024-04-11

## 2024-06-03 ENCOUNTER — OFFICE VISIT (OUTPATIENT)
Dept: FAMILY MEDICINE CLINIC | Facility: CLINIC | Age: 44
End: 2024-06-03
Payer: COMMERCIAL

## 2024-06-03 VITALS
HEIGHT: 67 IN | SYSTOLIC BLOOD PRESSURE: 120 MMHG | BODY MASS INDEX: 32.02 KG/M2 | WEIGHT: 204 LBS | TEMPERATURE: 98 F | HEART RATE: 67 BPM | DIASTOLIC BLOOD PRESSURE: 80 MMHG | OXYGEN SATURATION: 100 %

## 2024-06-03 DIAGNOSIS — T24.132A SUPERFICIAL BURN OF LEFT LOWER LEG, INITIAL ENCOUNTER: Primary | ICD-10-CM

## 2024-06-03 PROCEDURE — 99213 OFFICE O/P EST LOW 20 MIN: CPT

## 2024-06-03 RX ORDER — MUPIROCIN CALCIUM 20 MG/G
1 CREAM TOPICAL 3 TIMES DAILY
Qty: 4 EACH | Refills: 0 | COMMUNITY
Start: 2024-06-03

## 2024-06-03 RX ORDER — DOXYCYCLINE HYCLATE 100 MG/1
100 CAPSULE ORAL 2 TIMES DAILY
Qty: 10 CAPSULE | Refills: 0 | Status: SHIPPED | OUTPATIENT
Start: 2024-06-03 | End: 2024-06-08
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
## 2024-06-04 ENCOUNTER — TELEPHONE (OUTPATIENT)
Dept: FAMILY MEDICINE CLINIC | Facility: CLINIC | Age: 44
End: 2024-06-04
Payer: COMMERCIAL

## 2024-06-04 LAB
BASOPHILS # BLD AUTO: 0 X10E3/UL (ref 0–0.2)
BASOPHILS NFR BLD AUTO: 0 %
EOSINOPHIL # BLD AUTO: 0.1 X10E3/UL (ref 0–0.4)
EOSINOPHIL NFR BLD AUTO: 1 %
ERYTHROCYTE [DISTWIDTH] IN BLOOD BY AUTOMATED COUNT: 12.6 % (ref 11.7–15.4)
HCT VFR BLD AUTO: 43.4 % (ref 34–46.6)
HGB BLD-MCNC: 14.3 G/DL (ref 11.1–15.9)
IMM GRANULOCYTES # BLD AUTO: 0 X10E3/UL (ref 0–0.1)
IMM GRANULOCYTES NFR BLD AUTO: 0 %
LYMPHOCYTES # BLD AUTO: 1.7 X10E3/UL (ref 0.7–3.1)
LYMPHOCYTES NFR BLD AUTO: 23 %
MCH RBC QN AUTO: 29.1 PG (ref 26.6–33)
MCHC RBC AUTO-ENTMCNC: 32.9 G/DL (ref 31.5–35.7)
MCV RBC AUTO: 88 FL (ref 79–97)
MONOCYTES # BLD AUTO: 0.8 X10E3/UL (ref 0.1–0.9)
MONOCYTES NFR BLD AUTO: 12 %
NEUTROPHILS # BLD AUTO: 4.6 X10E3/UL (ref 1.4–7)
NEUTROPHILS NFR BLD AUTO: 64 %
PLATELET # BLD AUTO: 320 X10E3/UL (ref 150–450)
RBC # BLD AUTO: 4.91 X10E6/UL (ref 3.77–5.28)
WBC # BLD AUTO: 7.1 X10E3/UL (ref 3.4–10.8)

## 2024-06-04 RX ORDER — FLUCONAZOLE 150 MG/1
TABLET ORAL
Qty: 2 TABLET | Refills: 0 | Status: SHIPPED | OUTPATIENT
Start: 2024-06-04

## 2024-06-04 NOTE — TELEPHONE ENCOUNTER
Caller: Sonja Sierra    Relationship: Self    Best call back number: [phone]     What medication are you requesting: DIFLUCAN     What are your current symptoms: YEAST INFECTION    How long have you been experiencing symptoms:     Have you had these symptoms before:    [x] Yes  [] No    Have you been treated for these symptoms before:   [x] Yes  [] No    If a prescription is needed, what is your preferred pharmacy and phone number: [facility] [id number] - [facility] [address] AT [facility] [address] [facility] [phone] [facility] [phone] FX     Additional notes:PATIENT CALLING WANTING TO KNOW IF SHE CAN GET A PRESCRIPTION CALLED IN DUE TO TAKING AN ANTIBIOTIC.

## 2024-11-12 ENCOUNTER — OFFICE VISIT (OUTPATIENT)
Dept: FAMILY MEDICINE CLINIC | Facility: CLINIC | Age: 44
End: 2024-11-12
Payer: COMMERCIAL

## 2024-11-12 VITALS
SYSTOLIC BLOOD PRESSURE: 124 MMHG | RESPIRATION RATE: 16 BRPM | HEIGHT: 67 IN | HEART RATE: 68 BPM | WEIGHT: 198.2 LBS | DIASTOLIC BLOOD PRESSURE: 70 MMHG | TEMPERATURE: 98 F | BODY MASS INDEX: 31.11 KG/M2

## 2024-11-12 DIAGNOSIS — N89.8 VAGINAL DISCHARGE: Primary | ICD-10-CM

## 2024-11-12 PROCEDURE — 99212 OFFICE O/P EST SF 10 MIN: CPT | Performed by: NURSE PRACTITIONER

## 2024-11-12 RX ORDER — FLUCONAZOLE 150 MG/1
150 TABLET ORAL ONCE
Qty: 1 TABLET | Refills: 0 | Status: CANCELLED | OUTPATIENT
Start: 2024-11-12 | End: 2024-11-12

## 2024-11-19 ENCOUNTER — TELEPHONE (OUTPATIENT)
Dept: FAMILY MEDICINE CLINIC | Facility: CLINIC | Age: 44
End: 2024-11-19
Payer: COMMERCIAL

## 2024-11-19 PROBLEM — N89.8 VAGINAL DISCHARGE: Status: ACTIVE | Noted: 2024-11-19

## 2024-11-19 NOTE — TELEPHONE ENCOUNTER
Caller: Sonja Sierra    Relationship: Self    Best call back number:   Telephone Information:   Mobile [phone]        Caller requesting test results: PATIENT    What test was performed:URINE    When was the test performed: 11/12/2024    Where was the test performed: [facility]    Additional notes: PATIENT STATES THAT THEY THE OFFICE TOOK A URINE SAMPLE ON 11/12/2024 AND SHE IS REQUESTING THE RESULTS OF THAT TEST.

## 2025-01-02 ENCOUNTER — SPECIALTY PHARMACY (OUTPATIENT)
Dept: NEUROLOGY | Facility: CLINIC | Age: 45
End: 2025-01-02
Payer: COMMERCIAL